# Patient Record
Sex: FEMALE | NOT HISPANIC OR LATINO | URBAN - METROPOLITAN AREA
[De-identification: names, ages, dates, MRNs, and addresses within clinical notes are randomized per-mention and may not be internally consistent; named-entity substitution may affect disease eponyms.]

---

## 2021-09-23 ENCOUNTER — EMERGENCY (EMERGENCY)
Facility: HOSPITAL | Age: 51
LOS: 0 days | Discharge: HOME | End: 2021-09-23
Attending: STUDENT IN AN ORGANIZED HEALTH CARE EDUCATION/TRAINING PROGRAM
Payer: MEDICAID

## 2021-09-23 VITALS
SYSTOLIC BLOOD PRESSURE: 129 MMHG | DIASTOLIC BLOOD PRESSURE: 71 MMHG | OXYGEN SATURATION: 100 % | RESPIRATION RATE: 18 BRPM | TEMPERATURE: 98 F | HEART RATE: 78 BPM

## 2021-09-23 VITALS
TEMPERATURE: 98 F | HEIGHT: 66 IN | RESPIRATION RATE: 17 BRPM | OXYGEN SATURATION: 100 % | SYSTOLIC BLOOD PRESSURE: 182 MMHG | DIASTOLIC BLOOD PRESSURE: 90 MMHG | WEIGHT: 134.92 LBS | HEART RATE: 94 BPM

## 2021-09-23 DIAGNOSIS — R51.9 HEADACHE, UNSPECIFIED: ICD-10-CM

## 2021-09-23 DIAGNOSIS — R11.2 NAUSEA WITH VOMITING, UNSPECIFIED: ICD-10-CM

## 2021-09-23 DIAGNOSIS — R42 DIZZINESS AND GIDDINESS: ICD-10-CM

## 2021-09-23 LAB
ALBUMIN SERPL ELPH-MCNC: 3.9 G/DL — SIGNIFICANT CHANGE UP (ref 3.3–5)
ALP SERPL-CCNC: 69 U/L — SIGNIFICANT CHANGE UP (ref 40–120)
ALT FLD-CCNC: 22 U/L — SIGNIFICANT CHANGE UP (ref 12–78)
ANION GAP SERPL CALC-SCNC: 8 MMOL/L — SIGNIFICANT CHANGE UP (ref 5–17)
APPEARANCE UR: CLEAR — SIGNIFICANT CHANGE UP
AST SERPL-CCNC: 18 U/L — SIGNIFICANT CHANGE UP (ref 15–37)
BILIRUB SERPL-MCNC: 0.3 MG/DL — SIGNIFICANT CHANGE UP (ref 0.2–1.2)
BILIRUB UR-MCNC: NEGATIVE — SIGNIFICANT CHANGE UP
BUN SERPL-MCNC: 10 MG/DL — SIGNIFICANT CHANGE UP (ref 7–23)
CALCIUM SERPL-MCNC: 8.6 MG/DL — SIGNIFICANT CHANGE UP (ref 8.5–10.1)
CHLORIDE SERPL-SCNC: 105 MMOL/L — SIGNIFICANT CHANGE UP (ref 96–108)
CO2 SERPL-SCNC: 24 MMOL/L — SIGNIFICANT CHANGE UP (ref 22–31)
COLOR SPEC: YELLOW — SIGNIFICANT CHANGE UP
CREAT SERPL-MCNC: 0.82 MG/DL — SIGNIFICANT CHANGE UP (ref 0.5–1.3)
DIFF PNL FLD: ABNORMAL
GLUCOSE SERPL-MCNC: 97 MG/DL — SIGNIFICANT CHANGE UP (ref 70–99)
GLUCOSE UR QL: NEGATIVE MG/DL — SIGNIFICANT CHANGE UP
HCG SERPL-ACNC: <1 MIU/ML — SIGNIFICANT CHANGE UP
KETONES UR-MCNC: NEGATIVE — SIGNIFICANT CHANGE UP
LEUKOCYTE ESTERASE UR-ACNC: NEGATIVE — SIGNIFICANT CHANGE UP
NITRITE UR-MCNC: NEGATIVE — SIGNIFICANT CHANGE UP
PH UR: 8 — SIGNIFICANT CHANGE UP (ref 5–8)
POTASSIUM SERPL-MCNC: 3.2 MMOL/L — LOW (ref 3.5–5.3)
POTASSIUM SERPL-SCNC: 3.2 MMOL/L — LOW (ref 3.5–5.3)
PROT SERPL-MCNC: 7.3 GM/DL — SIGNIFICANT CHANGE UP (ref 6–8.3)
PROT UR-MCNC: NEGATIVE MG/DL — SIGNIFICANT CHANGE UP
SODIUM SERPL-SCNC: 137 MMOL/L — SIGNIFICANT CHANGE UP (ref 135–145)
SP GR SPEC: 1.01 — SIGNIFICANT CHANGE UP (ref 1.01–1.02)
TROPONIN I SERPL-MCNC: <0.015 NG/ML — SIGNIFICANT CHANGE UP (ref 0.01–0.04)
UROBILINOGEN FLD QL: NEGATIVE MG/DL — SIGNIFICANT CHANGE UP

## 2021-09-23 PROCEDURE — 85025 COMPLETE CBC W/AUTO DIFF WBC: CPT

## 2021-09-23 PROCEDURE — 70450 CT HEAD/BRAIN W/O DYE: CPT

## 2021-09-23 PROCEDURE — 96374 THER/PROPH/DIAG INJ IV PUSH: CPT

## 2021-09-23 PROCEDURE — 93010 ELECTROCARDIOGRAM REPORT: CPT

## 2021-09-23 PROCEDURE — 99285 EMERGENCY DEPT VISIT HI MDM: CPT

## 2021-09-23 PROCEDURE — 81001 URINALYSIS AUTO W/SCOPE: CPT

## 2021-09-23 PROCEDURE — 36415 COLL VENOUS BLD VENIPUNCTURE: CPT

## 2021-09-23 PROCEDURE — 84702 CHORIONIC GONADOTROPIN TEST: CPT

## 2021-09-23 PROCEDURE — 93005 ELECTROCARDIOGRAM TRACING: CPT

## 2021-09-23 PROCEDURE — G1004: CPT

## 2021-09-23 PROCEDURE — 84484 ASSAY OF TROPONIN QUANT: CPT

## 2021-09-23 PROCEDURE — 80053 COMPREHEN METABOLIC PANEL: CPT

## 2021-09-23 PROCEDURE — 99284 EMERGENCY DEPT VISIT MOD MDM: CPT | Mod: 25

## 2021-09-23 PROCEDURE — 70450 CT HEAD/BRAIN W/O DYE: CPT | Mod: 26,MG

## 2021-09-23 PROCEDURE — 87086 URINE CULTURE/COLONY COUNT: CPT

## 2021-09-23 RX ORDER — METOCLOPRAMIDE HCL 10 MG
10 TABLET ORAL ONCE
Refills: 0 | Status: COMPLETED | OUTPATIENT
Start: 2021-09-23 | End: 2021-09-23

## 2021-09-23 RX ORDER — MECLIZINE HCL 12.5 MG
25 TABLET ORAL ONCE
Refills: 0 | Status: COMPLETED | OUTPATIENT
Start: 2021-09-23 | End: 2021-09-23

## 2021-09-23 RX ORDER — POTASSIUM CHLORIDE 20 MEQ
40 PACKET (EA) ORAL ONCE
Refills: 0 | Status: COMPLETED | OUTPATIENT
Start: 2021-09-23 | End: 2021-09-23

## 2021-09-23 RX ORDER — ACETAMINOPHEN 500 MG
650 TABLET ORAL ONCE
Refills: 0 | Status: COMPLETED | OUTPATIENT
Start: 2021-09-23 | End: 2021-09-23

## 2021-09-23 RX ORDER — MECLIZINE HCL 12.5 MG
1 TABLET ORAL
Qty: 9 | Refills: 0
Start: 2021-09-23 | End: 2021-09-25

## 2021-09-23 RX ORDER — SODIUM CHLORIDE 9 MG/ML
1000 INJECTION INTRAMUSCULAR; INTRAVENOUS; SUBCUTANEOUS ONCE
Refills: 0 | Status: COMPLETED | OUTPATIENT
Start: 2021-09-23 | End: 2021-09-23

## 2021-09-23 RX ADMIN — Medication 40 MILLIEQUIVALENT(S): at 12:48

## 2021-09-23 RX ADMIN — Medication 10 MILLIGRAM(S): at 11:27

## 2021-09-23 RX ADMIN — Medication 650 MILLIGRAM(S): at 11:28

## 2021-09-23 RX ADMIN — SODIUM CHLORIDE 1000 MILLILITER(S): 9 INJECTION INTRAMUSCULAR; INTRAVENOUS; SUBCUTANEOUS at 11:32

## 2021-09-23 RX ADMIN — Medication 25 MILLIGRAM(S): at 11:27

## 2021-09-23 NOTE — ED PROVIDER NOTE - CARE PROVIDER_API CALL
Rubina Melgar (MD)  Clinical Neurophysiology; EEGEpilepsy; Neurology; Sleep Medicine  [address] [facility], [place] [address]  Phone: [phone]  Fax: [phone]  Follow Up Time:

## 2021-09-23 NOTE — ED PROVIDER NOTE - PATIENT PORTAL LINK FT
You can access the FollowMyHealth Patient Portal offered by [facility] by registering at the following website: [email or web address]. By joining [facility]’s FollowMyHealth portal, you will also be able to view your health information using other applications (apps) compatible with our system.

## 2021-09-23 NOTE — ED ADULT TRIAGE NOTE - CHIEF COMPLAINT QUOTE
pt presents to via car. pt reports vertigo x 1 day, hx of vertigo. pt screaming in triage. pt not answering questions. pt vitals stable

## 2021-09-23 NOTE — ED PROVIDER NOTE - OBJECTIVE STATEMENT
51 yof w/ PMHx of vertigo present to ED c.o. x2 days of intermittent dizziness worsening with change of position and head movement. Pt. states last recurrent episode of vertigo was in 2001. Pt. presents with left-sided headache, nausea and vomiting w/ symptoms worsening this morning. Pt. denies numbness, weakness, or trauma.

## 2021-09-23 NOTE — ED ADULT NURSE NOTE - OBJECTIVE STATEMENT
Pt c/o vertigo, left-sided head pain, and n/v that started two days ago.  Pt states symptoms have been getting worse over time.  Pt vomited this morning and has been nauseous since.  Pt denies fever, shortness of breath.  PMH includes vertigo.

## 2021-09-23 NOTE — ED PROVIDER NOTE - CLINICAL SUMMARY MEDICAL DECISION MAKING FREE TEXT BOX
Dizziness work-up; ECG, labs, ct head, meds, re-eval. 52 yo female with dizziness; ECG, labs, ct head, meds, re-eval.

## 2021-09-23 NOTE — ED ADULT NURSE NOTE - NSIMPLEMENTINTERV_GEN_ALL_ED
Implemented All Fall Risk Interventions:  [place] to call system. Call bell, personal items and telephone within reach. Instruct patient to call for assistance. Room bathroom lighting operational. Non-slip footwear when patient is off stretcher. Physically safe environment: no spills, clutter or unnecessary equipment. Stretcher in lowest position, wheels locked, appropriate side rails in place. Provide visual cue, wrist band, yellow gown, etc. Monitor gait and stability. Monitor for mental status changes and reorient to person, place, and time. Review medications for side effects contributing to fall risk. Reinforce activity limits and safety measures with patient and family.

## 2021-09-23 NOTE — ED PROVIDER NOTE - PROGRESS NOTE DETAILS
Hugo Zarate: Pt. feeling better. Able to ambulate, steady gait. Elliot JIMENEZ: patient notified of all labs, no previous H&H; instructed to f/u with neurology and/or ENT as outpatient; strict return precautions given.
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
## 2021-09-24 LAB
CULTURE RESULTS: SIGNIFICANT CHANGE UP
SPECIMEN SOURCE: SIGNIFICANT CHANGE UP

## 2021-10-18 PROBLEM — R42 DIZZINESS AND GIDDINESS: Chronic | Status: ACTIVE | Noted: 2021-09-23

## 2021-10-21 ENCOUNTER — APPOINTMENT (OUTPATIENT)
Dept: INTERNAL MEDICINE | Facility: CLINIC | Age: 51
End: 2021-10-21
Payer: MEDICAID

## 2021-10-21 VITALS
DIASTOLIC BLOOD PRESSURE: 80 MMHG | HEART RATE: 86 BPM | BODY MASS INDEX: 21.53 KG/M2 | OXYGEN SATURATION: 99 % | RESPIRATION RATE: 16 BRPM | WEIGHT: 134 LBS | HEIGHT: 66 IN | SYSTOLIC BLOOD PRESSURE: 144 MMHG | TEMPERATURE: 99 F

## 2021-10-21 DIAGNOSIS — Z80.7 FAMILY HISTORY OF OTHER MALIGNANT NEOPLASMS OF LYMPHOID, HEMATOPOIETIC AND RELATED TISSUES: ICD-10-CM

## 2021-10-21 DIAGNOSIS — Z63.5 DISRUPTION OF FAMILY BY SEPARATION AND DIVORCE: ICD-10-CM

## 2021-10-21 DIAGNOSIS — Z82.49 FAMILY HISTORY OF ISCHEMIC HEART DISEASE AND OTHER DISEASES OF THE CIRCULATORY SYSTEM: ICD-10-CM

## 2021-10-21 DIAGNOSIS — Z80.51 FAMILY HISTORY OF MALIGNANT NEOPLASM OF KIDNEY: ICD-10-CM

## 2021-10-21 DIAGNOSIS — Z80.0 FAMILY HISTORY OF MALIGNANT NEOPLASM OF DIGESTIVE ORGANS: ICD-10-CM

## 2021-10-21 DIAGNOSIS — Z87.19 PERSONAL HISTORY OF OTHER DISEASES OF THE DIGESTIVE SYSTEM: ICD-10-CM

## 2021-10-21 DIAGNOSIS — Z81.8 FAMILY HISTORY OF OTHER MENTAL AND BEHAVIORAL DISORDERS: ICD-10-CM

## 2021-10-21 PROCEDURE — 93000 ELECTROCARDIOGRAM COMPLETE: CPT

## 2021-10-21 PROCEDURE — 99386 PREV VISIT NEW AGE 40-64: CPT | Mod: 25

## 2021-10-21 SDOH — SOCIAL STABILITY - SOCIAL INSECURITY: DISRUPTION OF FAMILY BY SEPARATION AND DIVORCE: Z63.5

## 2021-10-21 NOTE — PAST MEDICAL HISTORY
[Menstruating] : menstruating [Definite ___ (Date)] : the last menstrual period was [unfilled] [Regular Cycle Intervals] : have been regular [Dysmenorrhea] : dysmenorrhea [Total Preg ___] : G[unfilled] [Living ___] : Living: [unfilled]

## 2021-10-22 PROBLEM — Z63.5 DIVORCED: Status: ACTIVE | Noted: 2021-10-22

## 2021-10-22 PROBLEM — Z81.8 FAMILY HISTORY OF DEMENTIA: Status: ACTIVE | Noted: 2021-10-22

## 2021-10-22 PROBLEM — Z80.0 FAMILY HISTORY OF COLON CANCER: Status: ACTIVE | Noted: 2021-10-22

## 2021-10-22 PROBLEM — Z87.19 HISTORY OF HEMORRHOIDS: Status: RESOLVED | Noted: 2021-10-22 | Resolved: 2021-10-22

## 2021-10-22 PROBLEM — Z80.51 FAMILY HISTORY OF RENAL CELL CARCINOMA: Status: ACTIVE | Noted: 2021-10-22

## 2021-10-22 PROBLEM — Z80.7 FAMILY HISTORY OF MULTIPLE MYELOMA: Status: ACTIVE | Noted: 2021-10-22

## 2021-10-22 PROBLEM — Z82.49 FAMILY HISTORY OF HYPERTENSION: Status: ACTIVE | Noted: 2021-10-22

## 2021-10-22 NOTE — REVIEW OF SYSTEMS
[Negative] : Psychiatric [FreeTextEntry2] : see HPI [FreeTextEntry7] : see HPI [FreeTextEntry8] : see HPI [de-identified] : see HPI

## 2021-10-22 NOTE — DATA REVIEWED
[FreeTextEntry1] : HIE \par \par 9/13/21\par \par cbc wnl, except Hg 9.8\par cmp wnl, except K 3.2\par Trop < 0.015\par HCG negative\par UA small blood, no rbc\par ucx: < 10k nl regi\par \par CTH: no acute pathology\par

## 2021-10-22 NOTE — ASSESSMENT
[FreeTextEntry1] : \par 50 yo F pmhx premenstrual syndrome, dysmenorrhea, anemia, chronic headaches, low vit d, s/p ER 9/21 with vertigo here for new pt CPE\par \par hx chronic HAs- recent dizziness, likely BPPV.  Nonfocal neuro exam today.\par -cont. Meclizine prn, refilled\par -advised Epley maneuver, pt handout given and reviewed with pt\par -neuro referral given\par -optho referral for eval\par -advised increased hydration\par -advised prompt medical eval if sx's worsen or new sx's arise\par \par anemia, hx dysmenorrhea, PMS- c/o gen fatigue\par -on Fluoxetine per GYN with good control reported\par -9/21 (ER) Hg 9.8\par -on iron (hx constipation with use)- advised increased hydration and fiber\par -GYN f/u, referral given\par -check labs: cbc/iron studies/B12/folate\par -wishes to consider heme eval re; IV iron if labs done today remain abnl\par -advised prompt medical eval if onset sob, CP, palpitations, etc.\par \par hx low vit d-\par -check level\par \par MISC:  Continued social distancing and measure for covid19 prevention encouraged.  \par -vaccine encouraged\par \par \par HCM\par -check screening labs; agreeable STD/HIV screening\par -wishes to check screening UA at next visit as with menses today\par -STD prevention with regular use of condoms counseled.\par -declines flu shot\par -declines Tdap\par -declines shingles vaccine\par -hx negative PAP 2019 by GYN, referral for new given per request\par -hx negative mammo 2019, Rx for repeat given\par -hx screening colonoscopy 2015: thinks normal.  Unsure of rec's to repeat (+[facility] colon ca).  Unable to get record.  GI referral given.\par -derm referral for screening.  Regular use of sun block for skin cancer prevention advised.\par -yearly dental screening advised\par \par \par Labs drawn in office today.\par

## 2021-10-22 NOTE — HEALTH RISK ASSESSMENT
[0] : 2) Feeling down, depressed, or hopeless: Not at all (0) [PHQ-2 Negative - No further assessment needed] : PHQ-2 Negative - No further assessment needed [Patient reported mammogram was normal] : Patient reported mammogram was normal [Patient reported PAP Smear was normal] : Patient reported PAP Smear was normal [Smoke Detector] : smoke detector [Carbon Monoxide Detector] : carbon monoxide detector [Seat Belt] :  uses seat belt [Sunscreen] : uses sunscreen [[id number]] : 0 [Guns at Home] : no guns at home [MammogramDate] : 01/19 [PapSmearDate] : 01/19 [ColonoscopyDate] : 01/2015 [ColonoscopyComments] : thinks normal.  Unsure of rec's to repeat

## 2021-10-22 NOTE — HISTORY OF PRESENT ILLNESS
[Lives Alone] : Patient lives alone [] :  [Occupation ___] : occupation: [unfilled] [Never Smoked Cigarettes] : has never smoked cigarettes [Never] : has never used illicit drugs [Premenopausal] : the patient is premenopausal [Good] : good [Reg. Dental Visits] : She has regular dental visits [Healthy Diet] : She consumes a diverse and healthy diet [FreeTextEntry1] : \par Here to establish care. [Vision Problems] : She denies vision problems [Hearing Loss] : She denies hearing loss [Regular Exercise] : She does not exercise regularly [de-identified] : 2019, prior PMD [de-identified] : Single [de-identified] : declines hx flu shot, hx Tdap > 10 yrs - declines, hx shingles vaccine [de-identified] : \par Feeling well.\par Fasting today.\par \par \par Reports is socially distancing and using precautions for covid prevention.\par Denies sick or covid positive contacts.\par Denies fever, chills, cough or sob.\par -no vaccine yet\par \par c/o gen fatigue, dizziness and HA x  few weeks\par \par Reports hx HH ER visit 9/13/21 with dizziness- dx'd vertigo\par -had labs\par -had CTH, negative\par -d/c'd home with Rx for meclizine and advised neuro and ENT evals\par \par hx dizziness- reports is 90% better since ER, needs neurology referral\par mainly room spinning sensation a/w nausea with head movements\par took Meclizine with help, requests refill\par denies hx URI ore ear sx's prior to sx onset\par denies vision trouble, dysarthria or focal weakness\par \par hx chronic headaches- at baseline q 6 weeks, since 9/21 more frequent\par -HA: throbbing, frontal, +light/noise sensitivity; no vision loss or dizziness, dysuria or focal weakness\par -resolves with sleep or Excedrin use\par -triggers: dehydration\par -denies hx neurology eval\par -hx negative CTs scans- last 9/21 in ER for vertigo\par -does not follow with optho\par \par hx anemia- "all of my life", unsure of etiology\par -reports hx heavy menses, getting heavier x 1 yr.  Denies known hx of fibroids.  Needs new GYN as recently moved.\par -on oral iron on/off many yrs, consistently x 3 yrs \par -hx screening colonoscopy 2015: thinks normal.  Unsure of rec's to repeat, states unable to get record. +FH colon ca in father at 56yo\par -denies palpitations, CP or sob\par \par Reports recently intentionally lost 15 lbs with intermittent fasting.\par Reports healthy diet, low red meat.\par No formal exercise, stays active\par \par Reports nl appetite and BMs- occasional constipation with iron, better with increased water\par Denies BRBPR, notes stool black when taking oral iron.\par Denies abd pain or vomiting.\par \par hx PMS- reports controlled\par -on Rx by GYN x 1 0 yrs, last seen 2019 - needs new\par \par Sexually active with 1 male partner x 8 yrs, monogamous, no condoms; denies hx STD dx.\par -denies  complaints.\par \par Reports hx dental eval 2 weeks- dx'd with right lower tooth infection with root canal done and abx given- now resolved.\par -denies tooth pain\par

## 2021-10-22 NOTE — PHYSICAL EXAM
[No Acute Distress] : no acute distress [Well-Appearing] : well-appearing [Normal Sclera/Conjunctiva] : normal sclera/conjunctiva [PERRL] : pupils equal round and reactive to light [EOMI] : extraocular movements intact [Normal Outer Ear/Nose] : the outer ears and nose were normal in appearance [Normal Oropharynx] : the oropharynx was normal [Normal TMs] : both tympanic membranes were normal [Normal Nasal Mucosa] : the nasal mucosa was normal [No Lymphadenopathy] : no lymphadenopathy [Supple] : supple [Thyroid Normal, No Nodules] : the thyroid was normal and there were no nodules present [No Respiratory Distress] : no respiratory distress  [Clear to Auscultation] : lungs were clear to auscultation bilaterally [Normal Rate] : normal rate  [Regular Rhythm] : with a regular rhythm [Normal S1, S2] : normal S1 and S2 [No Murmur] : no murmur heard [Pedal Pulses Present] : the pedal pulses are present [No Edema] : there was no peripheral edema [Soft] : abdomen soft [Non Tender] : non-tender [No HSM] : no HSM [Normal Supraclavicular Nodes] : no supraclavicular lymphadenopathy [Normal Posterior Cervical Nodes] : no posterior cervical lymphadenopathy [Normal Anterior Cervical Nodes] : no anterior cervical lymphadenopathy [No CVA Tenderness] : no CVA  tenderness [No Spinal Tenderness] : no spinal tenderness [No Joint Swelling] : no joint swelling [Grossly Normal Strength/Tone] : grossly normal strength/tone [No Rash] : no rash [No Focal Deficits] : no focal deficits [Normal Gait] : normal gait [Normal Affect] : the affect was normal [Alert and Oriented x3] : oriented to person, place, and time [de-identified] : scattered freckles [de-identified] : min horizontal  nystagmus; negative romberg's

## 2021-10-23 LAB
25(OH)D3 SERPL-MCNC: 40 NG/ML
ALBUMIN SERPL ELPH-MCNC: 4.5 G/DL
ALP BLD-CCNC: 77 U/L
ALT SERPL-CCNC: 15 U/L
ANION GAP SERPL CALC-SCNC: 12 MMOL/L
AST SERPL-CCNC: 19 U/L
BASOPHILS # BLD AUTO: 0.04 K/UL
BASOPHILS NFR BLD AUTO: 0.7 %
BILIRUB SERPL-MCNC: <0.2 MG/DL
BUN SERPL-MCNC: 10 MG/DL
C TRACH RRNA SPEC QL NAA+PROBE: NOT DETECTED
CALCIUM SERPL-MCNC: 9.9 MG/DL
CHLORIDE SERPL-SCNC: 104 MMOL/L
CHOLEST SERPL-MCNC: 186 MG/DL
CO2 SERPL-SCNC: 25 MMOL/L
CREAT SERPL-MCNC: 0.91 MG/DL
EOSINOPHIL # BLD AUTO: 0.04 K/UL
EOSINOPHIL NFR BLD AUTO: 0.7 %
ESTIMATED AVERAGE GLUCOSE: 88 MG/DL
FERRITIN SERPL-MCNC: 19 NG/ML
FOLATE SERPL-MCNC: 7 NG/ML
GLUCOSE SERPL-MCNC: 78 MG/DL
HBA1C MFR BLD HPLC: 4.7 %
HBV CORE IGG+IGM SER QL: NONREACTIVE
HBV SURFACE AB SER QL: NONREACTIVE
HBV SURFACE AG SER QL: NONREACTIVE
HCT VFR BLD CALC: 35.7 %
HCV AB SER QL: NONREACTIVE
HCV S/CO RATIO: 0.1 S/CO
HDLC SERPL-MCNC: 73 MG/DL
HGB BLD-MCNC: 10.9 G/DL
HIV1+2 AB SPEC QL IA.RAPID: NONREACTIVE
IMM GRANULOCYTES NFR BLD AUTO: 0.5 %
IRON SATN MFR SERPL: 68 %
IRON SERPL-MCNC: 253 UG/DL
LDLC SERPL CALC-MCNC: 95 MG/DL
LYMPHOCYTES # BLD AUTO: 1.21 K/UL
LYMPHOCYTES NFR BLD AUTO: 21 %
MAN DIFF?: NORMAL
MCHC RBC-ENTMCNC: 27.8 PG
MCHC RBC-ENTMCNC: 30.5 GM/DL
MCV RBC AUTO: 91.1 FL
MONOCYTES # BLD AUTO: 0.36 K/UL
MONOCYTES NFR BLD AUTO: 6.2 %
N GONORRHOEA RRNA SPEC QL NAA+PROBE: NOT DETECTED
NEUTROPHILS # BLD AUTO: 4.09 K/UL
NEUTROPHILS NFR BLD AUTO: 70.9 %
NONHDLC SERPL-MCNC: 113 MG/DL
PLATELET # BLD AUTO: 362 K/UL
POTASSIUM SERPL-SCNC: 4.3 MMOL/L
PROT SERPL-MCNC: 7 G/DL
RBC # BLD: 3.92 M/UL
RBC # FLD: 14.1 %
SODIUM SERPL-SCNC: 142 MMOL/L
SOURCE AMPLIFICATION: NORMAL
T PALLIDUM AB SER QL IA: NEGATIVE
TIBC SERPL-MCNC: 373 UG/DL
TRIGL SERPL-MCNC: 90 MG/DL
TSH SERPL-ACNC: 3.08 UIU/ML
UIBC SERPL-MCNC: 120 UG/DL
VIT B12 SERPL-MCNC: 730 PG/ML
WBC # FLD AUTO: 5.77 K/UL

## 2021-11-17 ENCOUNTER — APPOINTMENT (OUTPATIENT)
Dept: NEUROLOGY | Facility: CLINIC | Age: 51
End: 2021-11-17
Payer: MEDICAID

## 2021-11-17 ENCOUNTER — NON-APPOINTMENT (OUTPATIENT)
Age: 51
End: 2021-11-17

## 2021-11-17 VITALS
DIASTOLIC BLOOD PRESSURE: 76 MMHG | SYSTOLIC BLOOD PRESSURE: 110 MMHG | HEIGHT: 66 IN | HEART RATE: 72 BPM | BODY MASS INDEX: 20.25 KG/M2 | TEMPERATURE: 97.5 F | WEIGHT: 126 LBS

## 2021-11-17 PROCEDURE — 99204 OFFICE O/P NEW MOD 45 MIN: CPT

## 2021-11-17 RX ORDER — MECLIZINE HYDROCHLORIDE 25 MG/1
25 TABLET ORAL
Refills: 0 | Status: COMPLETED | COMMUNITY
End: 2021-11-17

## 2021-11-17 NOTE — PHYSICAL EXAM
[General Appearance - Alert] : alert [General Appearance - In No Acute Distress] : in no acute distress [Oriented To Time, Place, And Person] : oriented to person, place, and time [Affect] : the affect was normal [Impaired Insight] : insight and judgment were intact [Person] : oriented to person [Place] : oriented to place [Time] : oriented to time [Concentration Intact] : normal concentrating ability [Visual Intact] : visual attention was ~T not ~L decreased [Naming Objects] : no difficulty naming common objects [Repeating Phrases] : no difficulty repeating a phrase [Writing A Sentence] : no difficulty writing a sentence [Fluency] : fluency intact [Comprehension] : comprehension intact [Reading] : reading intact [Past History] : adequate knowledge of personal past history [Cranial Nerves Optic (II)] : visual acuity intact bilaterally,  visual fields full to confrontation, pupils equal round and reactive to light [Cranial Nerves Oculomotor (III)] : extraocular motion intact [Cranial Nerves Trigeminal (V)] : facial sensation intact symmetrically [Cranial Nerves Facial (VII)] : face symmetrical [Cranial Nerves Vestibulocochlear (VIII)] : hearing was intact bilaterally [Cranial Nerves Glossopharyngeal (IX)] : tongue and palate midline [Cranial Nerves Accessory (XI - Cranial And Spinal)] : head turning and shoulder shrug symmetric [Cranial Nerves Hypoglossal (XII)] : there was no tongue deviation with protrusion [Motor Tone] : muscle tone was normal in all four extremities [Motor Strength] : muscle strength was normal in all four extremities [No Muscle Atrophy] : normal bulk in all four extremities [Sensation Tactile Decrease] : light touch was intact [Abnormal Walk] : normal gait [Balance] : balance was intact [Past-pointing] : there was no past-pointing [Tremor] : no tremor present [2+] : Ankle jerk left 2+ [Plantar Reflex Right Only] : normal on the right [Plantar Reflex Left Only] : normal on the left [Sclera] : the sclera and conjunctiva were normal [PERRL With Normal Accommodation] : pupils were equal in size, round, reactive to light, with normal accommodation [Extraocular Movements] : extraocular movements were intact [Neck Appearance] : the appearance of the neck was normal [] : the neck was supple [Neck Cervical Mass (___cm)] : no neck mass was observed [Arterial Pulses Carotid] : carotid pulses were normal with no bruits [No Visual Abnormalities] : no visible abnormalities [No Scoliosis] : no scoliosis [No Masses] : no masses [Full ROM] : full ROM [No Pain with ROM] : no pain with motion in any direction

## 2021-11-17 NOTE — HISTORY OF PRESENT ILLNESS
[FreeTextEntry1] : 51-year-old woman right-handed with a history of migraine headaches, experiencing since September of this year, recurrent headaches, variable intensity, instructed beginning of the day, and last throughout the whole day, when severe she takes Excedrin for migraine, which sometimes helps. If the headache intensifies, can get dizzy, nauseous, lightheaded and sound sensitive, and occasionally vomit. Never lost vision, no trouble speaking, no unilateral weakness or numbness of the face or extremities. No episode of passing out or becoming unaware of her surroundings.\par Last September when to [facility], evaluated in the emergency room, a CT scan of the head was done and was negative. [Headache] : headache [Aura] : no aura [Dizziness] : dizziness [Nausea] : nausea [Photophobia] : photophobia [Phonophobia] : phonophobia [Neck Pain] : neck pain [Scotoma] : no scotoma [Numbness] : no numbness [Tingling] : no tingling [Weakness] : no weakness [Scalp Tenderness] : no scalp tenderness [Daily] : daily [> 4 hours] : > 4 hours [stayed the same] : stayed the same [Increased] : Overall, patient's activity level has increased [Worsened] : The patient reports ~his/her~ symptoms since the last visit have worsened

## 2021-11-17 NOTE — DATA REVIEWED
[FreeTextEntry1] :  EXAM:  CT BRAIN                      \par \par \par PROCEDURE DATE:  09/23/2021  \par \par \par \par INTERPRETATION:  CT OF THE HEAD WITHOUT CONTRAST\par \par CLINICAL INDICATION: Dizziness\par \par TECHNIQUE: Volumetric CT acquisition was performed through the brain and reviewed using brain and bone window technique. Dose optimization techniques were utilized including kVp/mA modulation along with iterative reconstructions.\par \par \par COMPARISON: CT head 8/19/2016\par \par FINDINGS:\par \par The ventricular and sulcal size and configuration is age appropriate.   There is no acute loss of gray-white differentiation.\par \par There is no evidence of mass effect, midline shift, acute intracranial hemorrhage, or extra-axial collections.\par \par  The calvarium is intact. The paranasal sinuses are clear.The mastoid air cells are predominantly clear. The orbits are unremarkable.\par \par \par IMPRESSION:\par No acute intracranial hemorrhage or acute territorial infarction.\par

## 2021-11-17 NOTE — ASSESSMENT
[FreeTextEntry1] : 51-year-old woman history of migraine headaches, now with daily headaches in September of this year. CT scan of the head was unremarkable.\par Possible transform migraines. Discuss options, trigger management.\par Plan: Start nortriptyline 10 mg p.o. q.h.s., if tolerated after one week, can increase 2 capsules p.o. q.h.s.\par Maxalt 10 mg, one tablet as needed for headache, can repeat in 2 hours. Maximum of 30 mg per day

## 2021-11-19 NOTE — DATA REVIEWED
[FreeTextEntry1] :  EXAM:  CT BRAIN                         PROCEDURE DATE:  09/23/2021      INTERPRETATION:  CT OF THE HEAD WITHOUT CONTRAST  CLINICAL INDICATION: Dizziness  TECHNIQUE: Volumetric CT acquisition was performed through the brain and reviewed using brain and bone window technique. Dose optimization techniques were utilized including kVp/mA modulation along with iterative reconstructions.   COMPARISON: CT head 8/19/2016  FINDINGS:  The ventricular and sulcal size and configuration is age appropriate.   There is no acute loss of gray-white differentiation.  There is no evidence of mass effect, midline shift, acute intracranial hemorrhage, or extra-axial collections.   The calvarium is intact. The paranasal sinuses are clear.The mastoid air cells are predominantly clear. The orbits are unremarkable.   IMPRESSION: No acute intracranial hemorrhage or acute territorial infarction.  --- End of Report ---

## 2021-11-22 ENCOUNTER — APPOINTMENT (OUTPATIENT)
Dept: INTERNAL MEDICINE | Facility: CLINIC | Age: 51
End: 2021-11-22

## 2021-11-29 ENCOUNTER — RESULT REVIEW (OUTPATIENT)
Age: 51
End: 2021-11-29

## 2021-11-29 ENCOUNTER — APPOINTMENT (OUTPATIENT)
Dept: HEMATOLOGY ONCOLOGY | Facility: CLINIC | Age: 51
End: 2021-11-29
Payer: MEDICAID

## 2021-11-29 ENCOUNTER — OUTPATIENT (OUTPATIENT)
Dept: OUTPATIENT SERVICES | Facility: HOSPITAL | Age: 51
LOS: 1 days | Discharge: HOME | End: 2021-11-29

## 2021-11-29 DIAGNOSIS — D64.9 ANEMIA, UNSPECIFIED: ICD-10-CM

## 2021-11-29 LAB
BASOPHILS # BLD AUTO: 0.03 K/UL — SIGNIFICANT CHANGE UP (ref 0–0.2)
BASOPHILS NFR BLD AUTO: 0.5 % — SIGNIFICANT CHANGE UP (ref 0–2)
EOSINOPHIL # BLD AUTO: 0.06 K/UL — SIGNIFICANT CHANGE UP (ref 0–0.5)
EOSINOPHIL NFR BLD AUTO: 0.9 % — SIGNIFICANT CHANGE UP (ref 0–6)
HCT VFR BLD CALC: 35.6 % — SIGNIFICANT CHANGE UP (ref 34.5–45)
HGB BLD-MCNC: 11.4 G/DL — LOW (ref 11.5–15.5)
IMM GRANULOCYTES NFR BLD AUTO: 0.5 % — SIGNIFICANT CHANGE UP (ref 0–1.5)
LYMPHOCYTES # BLD AUTO: 0.97 K/UL — LOW (ref 1–3.3)
LYMPHOCYTES # BLD AUTO: 14.6 % — SIGNIFICANT CHANGE UP (ref 13–44)
MCHC RBC-ENTMCNC: 27.5 PG — SIGNIFICANT CHANGE UP (ref 27–34)
MCHC RBC-ENTMCNC: 32 GM/DL — SIGNIFICANT CHANGE UP (ref 32–36)
MCV RBC AUTO: 86 FL — SIGNIFICANT CHANGE UP (ref 80–100)
MONOCYTES # BLD AUTO: 0.36 K/UL — SIGNIFICANT CHANGE UP (ref 0–0.9)
MONOCYTES NFR BLD AUTO: 5.4 % — SIGNIFICANT CHANGE UP (ref 2–14)
NEUTROPHILS # BLD AUTO: 5.18 K/UL — SIGNIFICANT CHANGE UP (ref 1.8–7.4)
NEUTROPHILS NFR BLD AUTO: 78.1 % — HIGH (ref 43–77)
NRBC # BLD: 0 /100 WBCS — SIGNIFICANT CHANGE UP (ref 0–0)
PLATELET # BLD AUTO: 263 K/UL — SIGNIFICANT CHANGE UP (ref 150–400)
RBC # BLD: 4.14 M/UL — SIGNIFICANT CHANGE UP (ref 3.8–5.2)
RBC # FLD: 12.5 % — SIGNIFICANT CHANGE UP (ref 10.3–14.5)
WBC # BLD: 6.63 K/UL — SIGNIFICANT CHANGE UP (ref 3.8–10.5)
WBC # FLD AUTO: 6.63 K/UL — SIGNIFICANT CHANGE UP (ref 3.8–10.5)

## 2021-11-29 PROCEDURE — 99203 OFFICE O/P NEW LOW 30 MIN: CPT

## 2021-11-29 NOTE — CONSULT LETTER
[Dear  ___] : Dear  [unfilled], [( Thank you for referring [unfilled] for consultation for _____ )] : Thank you for referring [unfilled] for consultation for [unfilled] [Please see my note below.] : Please see my note below. [Consult Closing:] : Thank you very much for allowing me to participate in the care of this patient.  If you have any questions, please do not hesitate to contact me. [Sincerely,] : Sincerely, [FreeTextEntry2] : Dr Brittany Dinero  [FreeTextEntry3] : Mishel Mcmillan

## 2021-11-29 NOTE — ASSESSMENT
[FreeTextEntry1] : 50 y/o female with chronic anemia and evidence of iron deficiency likely due to ongoing menstrual blood losses. She responds to oral iron but due to Gi side effects ( constipation)  she is not able to increase iron dose to fully replenish iron stores.\par Recommend parenteral iron: feraheme x 2 or Venofer x 4. Discussed benefits and potential adverse effects of iv iron. \par She agrees with plan.\par She also has appointment with GI for routine colonoscopy.\par \par For completeness- sent immunoelectrophoresis and hemoglobin electrophoresis ( r/o component of  hereditary hemoglobinopathy).\par \par Return visit next week to start iv iron.\par \par Exam 4-6 weeks after last iron.

## 2021-11-29 NOTE — HISTORY OF PRESENT ILLNESS
[de-identified] : 52 y/o female with history of chronic anemia  for many years. Her Hgb was down to 7 in the  past. She has been taking Ferrous sulfate 65 mg of elemental iron daily for at least three years ( takes on empty stomach). Did not tolerate twice daily iron - constipation. \par Menses heavy for years. has two children. Chronic fatigue. no GI c/o.\par \par No family hx of anemia

## 2021-11-29 NOTE — REVIEW OF SYSTEMS
[Patient Intake Form Reviewed] : Patient intake form was reviewed [Fatigue] : fatigue [Negative] : Allergic/Immunologic [FreeTextEntry8] : per HPI

## 2021-11-30 DIAGNOSIS — D50.0 IRON DEFICIENCY ANEMIA SECONDARY TO BLOOD LOSS (CHRONIC): ICD-10-CM

## 2021-11-30 LAB
ERYTHROCYTE [SEDIMENTATION RATE] IN BLOOD: 14 MM/HR — SIGNIFICANT CHANGE UP (ref 0–20)
FERRITIN SERPL-MCNC: 19 NG/ML — SIGNIFICANT CHANGE UP (ref 15–150)
IGA FLD-MCNC: 210 MG/DL — SIGNIFICANT CHANGE UP (ref 84–499)
IGG FLD-MCNC: 967 MG/DL — SIGNIFICANT CHANGE UP (ref 610–1660)
IGM SERPL-MCNC: 189 MG/DL — SIGNIFICANT CHANGE UP (ref 35–242)
IRON SATN MFR SERPL: 236 UG/DL — HIGH (ref 30–160)
IRON SATN MFR SERPL: 71 % — HIGH (ref 14–50)
KAPPA LC SER QL IFE: 1.54 MG/DL — SIGNIFICANT CHANGE UP (ref 0.33–1.94)
KAPPA/LAMBDA FREE LIGHT CHAIN RATIO, SERUM: 1.05 RATIO — SIGNIFICANT CHANGE UP (ref 0.26–1.65)
LAMBDA LC SER QL IFE: 1.46 MG/DL — SIGNIFICANT CHANGE UP (ref 0.57–2.63)
PROT SERPL-MCNC: 6.6 G/DL — SIGNIFICANT CHANGE UP (ref 6–8.3)
PROT SERPL-MCNC: 6.6 G/DL — SIGNIFICANT CHANGE UP (ref 6–8.3)
TIBC SERPL-MCNC: 332 UG/DL — SIGNIFICANT CHANGE UP (ref 220–430)
UIBC SERPL-MCNC: 96 UG/DL — LOW (ref 110–370)

## 2021-12-02 LAB
% ALBUMIN: 62.4 % — SIGNIFICANT CHANGE UP
% ALPHA 1: 3.7 % — SIGNIFICANT CHANGE UP
% ALPHA 2: 8.4 % — SIGNIFICANT CHANGE UP
% BETA: 11.4 % — SIGNIFICANT CHANGE UP
% GAMMA: 14.1 % — SIGNIFICANT CHANGE UP
ALBUMIN SERPL ELPH-MCNC: 4.1 G/DL — SIGNIFICANT CHANGE UP (ref 3.6–5.5)
ALBUMIN/GLOB SERPL ELPH: 1.6 RATIO — SIGNIFICANT CHANGE UP
ALPHA1 GLOB SERPL ELPH-MCNC: 0.2 G/DL — SIGNIFICANT CHANGE UP (ref 0.1–0.4)
ALPHA2 GLOB SERPL ELPH-MCNC: 0.6 G/DL — SIGNIFICANT CHANGE UP (ref 0.5–1)
B-GLOBULIN SERPL ELPH-MCNC: 0.8 G/DL — SIGNIFICANT CHANGE UP (ref 0.5–1)
GAMMA GLOBULIN: 0.9 G/DL — SIGNIFICANT CHANGE UP (ref 0.6–1.6)
INTERPRETATION SERPL IFE-IMP: SIGNIFICANT CHANGE UP
PROT PATTERN SERPL ELPH-IMP: SIGNIFICANT CHANGE UP

## 2021-12-13 ENCOUNTER — APPOINTMENT (OUTPATIENT)
Dept: OBGYN | Facility: CLINIC | Age: 51
End: 2021-12-13
Payer: MEDICAID

## 2021-12-13 ENCOUNTER — APPOINTMENT (OUTPATIENT)
Dept: INFUSION THERAPY | Facility: CLINIC | Age: 51
End: 2021-12-13

## 2021-12-13 ENCOUNTER — TRANSCRIPTION ENCOUNTER (OUTPATIENT)
Age: 51
End: 2021-12-13

## 2021-12-13 VITALS — SYSTOLIC BLOOD PRESSURE: 138 MMHG | DIASTOLIC BLOOD PRESSURE: 88 MMHG | WEIGHT: 138 LBS | BODY MASS INDEX: 22.27 KG/M2

## 2021-12-13 DIAGNOSIS — Z01.419 ENCOUNTER FOR GYNECOLOGICAL EXAMINATION (GENERAL) (ROUTINE) W/OUT ABNORMAL FINDINGS: ICD-10-CM

## 2021-12-13 PROCEDURE — 99386 PREV VISIT NEW AGE 40-64: CPT

## 2021-12-14 DIAGNOSIS — D50.9 IRON DEFICIENCY ANEMIA, UNSPECIFIED: ICD-10-CM

## 2021-12-20 LAB — HPV HIGH+LOW RISK DNA PNL CVX: NOT DETECTED

## 2021-12-22 ENCOUNTER — APPOINTMENT (OUTPATIENT)
Dept: GASTROENTEROLOGY | Facility: CLINIC | Age: 51
End: 2021-12-22
Payer: MEDICAID

## 2021-12-22 ENCOUNTER — APPOINTMENT (OUTPATIENT)
Dept: INFUSION THERAPY | Facility: CLINIC | Age: 51
End: 2021-12-22

## 2021-12-22 VITALS
SYSTOLIC BLOOD PRESSURE: 131 MMHG | HEIGHT: 66 IN | WEIGHT: 138 LBS | BODY MASS INDEX: 22.18 KG/M2 | DIASTOLIC BLOOD PRESSURE: 81 MMHG | HEART RATE: 80 BPM

## 2021-12-22 DIAGNOSIS — Z12.11 ENCOUNTER FOR SCREENING FOR MALIGNANT NEOPLASM OF COLON: ICD-10-CM

## 2021-12-22 DIAGNOSIS — Z80.0 ENCOUNTER FOR SCREENING FOR MALIGNANT NEOPLASM OF COLON: ICD-10-CM

## 2021-12-22 PROCEDURE — 99202 OFFICE O/P NEW SF 15 MIN: CPT

## 2021-12-22 NOTE — HISTORY OF PRESENT ILLNESS
[de-identified] : Ms. TOMASA VALLEJO is a 51 year old female presents for screening colonoscopy. Patient has no complaints of bowel issues, bleeding, abdominal pain, GERD symptoms. Patient's father was diagnosed with colon cancer at age 60. Patient does get heavy menses, and is anemic requiring iron infusions. Last colonoscopy was at age 40.\par \par

## 2022-01-10 ENCOUNTER — RESULT REVIEW (OUTPATIENT)
Age: 52
End: 2022-01-10

## 2022-01-10 ENCOUNTER — APPOINTMENT (OUTPATIENT)
Dept: MAMMOGRAPHY | Facility: CLINIC | Age: 52
End: 2022-01-10
Payer: MEDICAID

## 2022-01-10 ENCOUNTER — OUTPATIENT (OUTPATIENT)
Dept: OUTPATIENT SERVICES | Facility: HOSPITAL | Age: 52
LOS: 1 days | End: 2022-01-10
Payer: MEDICAID

## 2022-01-10 DIAGNOSIS — Z00.8 ENCOUNTER FOR OTHER GENERAL EXAMINATION: ICD-10-CM

## 2022-01-10 PROCEDURE — 77067 SCR MAMMO BI INCL CAD: CPT | Mod: 26

## 2022-01-10 PROCEDURE — 77063 BREAST TOMOSYNTHESIS BI: CPT

## 2022-01-10 PROCEDURE — 77063 BREAST TOMOSYNTHESIS BI: CPT | Mod: 26

## 2022-01-10 PROCEDURE — 77067 SCR MAMMO BI INCL CAD: CPT

## 2022-01-18 ENCOUNTER — OUTPATIENT (OUTPATIENT)
Dept: OUTPATIENT SERVICES | Facility: HOSPITAL | Age: 52
LOS: 1 days | Discharge: HOME | End: 2022-01-18
Payer: MEDICAID

## 2022-01-18 DIAGNOSIS — D50.9 IRON DEFICIENCY ANEMIA, UNSPECIFIED: ICD-10-CM

## 2022-01-19 ENCOUNTER — RESULT REVIEW (OUTPATIENT)
Age: 52
End: 2022-01-19

## 2022-01-19 ENCOUNTER — APPOINTMENT (OUTPATIENT)
Dept: HEMATOLOGY ONCOLOGY | Facility: CLINIC | Age: 52
End: 2022-01-19

## 2022-01-19 LAB
BASOPHILS # BLD AUTO: 0.02 K/UL — SIGNIFICANT CHANGE UP (ref 0–0.2)
BASOPHILS NFR BLD AUTO: 0.4 % — SIGNIFICANT CHANGE UP (ref 0–2)
EOSINOPHIL # BLD AUTO: 0.04 K/UL — SIGNIFICANT CHANGE UP (ref 0–0.5)
EOSINOPHIL NFR BLD AUTO: 0.8 % — SIGNIFICANT CHANGE UP (ref 0–6)
FERRITIN SERPL-MCNC: 244 NG/ML — HIGH (ref 15–150)
HCT VFR BLD CALC: 37.6 % — SIGNIFICANT CHANGE UP (ref 34.5–45)
HGB BLD-MCNC: 12 G/DL — SIGNIFICANT CHANGE UP (ref 11.5–15.5)
IMM GRANULOCYTES NFR BLD AUTO: 0.4 % — SIGNIFICANT CHANGE UP (ref 0–1.5)
IRON SATN MFR SERPL: 26 % — SIGNIFICANT CHANGE UP (ref 14–50)
IRON SATN MFR SERPL: 62 UG/DL — SIGNIFICANT CHANGE UP (ref 30–160)
LYMPHOCYTES # BLD AUTO: 1.22 K/UL — SIGNIFICANT CHANGE UP (ref 1–3.3)
LYMPHOCYTES # BLD AUTO: 23.2 % — SIGNIFICANT CHANGE UP (ref 13–44)
MCHC RBC-ENTMCNC: 28.1 PG — SIGNIFICANT CHANGE UP (ref 27–34)
MCHC RBC-ENTMCNC: 31.9 GM/DL — LOW (ref 32–36)
MCV RBC AUTO: 88.1 FL — SIGNIFICANT CHANGE UP (ref 80–100)
MONOCYTES # BLD AUTO: 0.32 K/UL — SIGNIFICANT CHANGE UP (ref 0–0.9)
MONOCYTES NFR BLD AUTO: 6.1 % — SIGNIFICANT CHANGE UP (ref 2–14)
NEUTROPHILS # BLD AUTO: 3.63 K/UL — SIGNIFICANT CHANGE UP (ref 1.8–7.4)
NEUTROPHILS NFR BLD AUTO: 69.1 % — SIGNIFICANT CHANGE UP (ref 43–77)
NRBC # BLD: 0 /100 WBCS — SIGNIFICANT CHANGE UP (ref 0–0)
PLATELET # BLD AUTO: 255 K/UL — SIGNIFICANT CHANGE UP (ref 150–400)
RBC # BLD: 4.27 M/UL — SIGNIFICANT CHANGE UP (ref 3.8–5.2)
RBC # FLD: 13.5 % — SIGNIFICANT CHANGE UP (ref 10.3–14.5)
TIBC SERPL-MCNC: 235 UG/DL — SIGNIFICANT CHANGE UP (ref 220–430)
UIBC SERPL-MCNC: 174 UG/DL — SIGNIFICANT CHANGE UP (ref 110–370)
WBC # BLD: 5.25 K/UL — SIGNIFICANT CHANGE UP (ref 3.8–10.5)
WBC # FLD AUTO: 5.25 K/UL — SIGNIFICANT CHANGE UP (ref 3.8–10.5)

## 2022-01-19 PROCEDURE — 83020 HEMOGLOBIN ELECTROPHORESIS: CPT | Mod: 26

## 2022-01-20 ENCOUNTER — LABORATORY RESULT (OUTPATIENT)
Age: 52
End: 2022-01-20

## 2022-01-20 ENCOUNTER — APPOINTMENT (OUTPATIENT)
Dept: OBGYN | Facility: CLINIC | Age: 52
End: 2022-01-20
Payer: MEDICAID

## 2022-01-20 VITALS
SYSTOLIC BLOOD PRESSURE: 116 MMHG | BODY MASS INDEX: 22.5 KG/M2 | DIASTOLIC BLOOD PRESSURE: 76 MMHG | HEIGHT: 66 IN | WEIGHT: 140 LBS

## 2022-01-20 LAB
HCG UR QL: NEGATIVE
HEMOGLOBIN INTERPRETATION: SIGNIFICANT CHANGE UP
HGB A MFR BLD: 97.5 % — SIGNIFICANT CHANGE UP (ref 95.8–98)
HGB A2 MFR BLD: 2.5 % — SIGNIFICANT CHANGE UP (ref 2–3.2)
QUALITY CONTROL: YES

## 2022-01-20 PROCEDURE — 81025 URINE PREGNANCY TEST: CPT

## 2022-01-20 PROCEDURE — 58100 BIOPSY OF UTERUS LINING: CPT

## 2022-01-20 NOTE — PROCEDURE
[Endometrial Biopsy] : Endometrial biopsy [Time out performed] : Pre-procedure time out performed.  Patient's name, date of birth and procedure confirmed. [Consent Obtained] : Consent obtained [Irregular Bleeding] : irregular uterine bleeding [Risks] : risks [Benefits] : benefits [Alternatives] : alternatives [Patient] : patient [Infection] : infection [Bleeding] : bleeding [Allergic Reaction] : allergic reaction [Uterine Perforation] : uterine perforation [Pain] : pain [Negative] : negative pregnancy test [Betadine] : Betadine [None] : none [Easy Passage] : Easy passage [Anteverted] : anteverted [Scant] : scant [Sent to Pathology] : placed in buffered formalin and sent for pathology [Tolerated Well] : Patient tolerated the procedure well [No Complications] : No complications [de-identified] : Ramírez clamp

## 2022-01-21 LAB
ESTRADIOL SERPL-MCNC: 35 PG/ML
FSH SERPL-MCNC: 45.1 IU/L

## 2022-01-23 ENCOUNTER — APPOINTMENT (OUTPATIENT)
Dept: ULTRASOUND IMAGING | Facility: CLINIC | Age: 52
End: 2022-01-23
Payer: MEDICAID

## 2022-01-23 ENCOUNTER — OUTPATIENT (OUTPATIENT)
Dept: OUTPATIENT SERVICES | Facility: HOSPITAL | Age: 52
LOS: 1 days | End: 2022-01-23
Payer: MEDICAID

## 2022-01-23 DIAGNOSIS — N93.9 ABNORMAL UTERINE AND VAGINAL BLEEDING, UNSPECIFIED: ICD-10-CM

## 2022-01-23 PROCEDURE — 76830 TRANSVAGINAL US NON-OB: CPT | Mod: 26

## 2022-01-23 PROCEDURE — 76830 TRANSVAGINAL US NON-OB: CPT

## 2022-01-25 ENCOUNTER — APPOINTMENT (OUTPATIENT)
Dept: NEUROLOGY | Facility: CLINIC | Age: 52
End: 2022-01-25
Payer: MEDICAID

## 2022-01-25 VITALS
BODY MASS INDEX: 21.69 KG/M2 | HEART RATE: 76 BPM | HEIGHT: 66 IN | DIASTOLIC BLOOD PRESSURE: 85 MMHG | TEMPERATURE: 97.8 F | SYSTOLIC BLOOD PRESSURE: 132 MMHG | WEIGHT: 135 LBS

## 2022-01-25 PROCEDURE — 99213 OFFICE O/P EST LOW 20 MIN: CPT

## 2022-01-25 NOTE — ASSESSMENT
[FreeTextEntry1] : 51-year-old woman history of chronic headaches, migraines,having around 10 headaches a month,and work her up from sleep, was fearful of trying nortriptyline, but is willing to give it a trial.\par Maxalt 10 mg, works as a while in getting related.\par Plan: Will resent a prescription for nortriptyline 10 mg p.o. q.h.s., if tolerable can increase after one week to 20 mg p.o. q.h.s. thereafter.\par Maxalt 10 mg p.o. p.r.n. headache can repeat if needed in 2 hours to\par return to office, 3-4 months

## 2022-01-25 NOTE — HISTORY OF PRESENT ILLNESS
[FreeTextEntry1] : 51 year old woman hx of headaches, here for f/u visit. Previous evaluations have included a Ct of head performed at  ED 9/2020, which was unremarkable. No clear triggers, can last most of the day, has tried OTC analgesics with variable results.\par Prescribed nortriptyline 10 mg po HS, but didn’t take. Also prescribed Maxalt 10 mg po as needed, which she reports works very well.\par .  [Chronic Headache] : chronic headache [Aura] : no aura [Dizziness] : no dizziness [Nausea] : no nausea [Vomiting] : no Vomiting [Neck Pain] : neck pain [___ Times Per Month] : [unfilled] times per month [> 4 hours] : > 4 hours [improved] : improved [Stable] : The patient reports ~his/her~ symptoms since the last visit are stable

## 2022-01-28 ENCOUNTER — NON-APPOINTMENT (OUTPATIENT)
Age: 52
End: 2022-01-28

## 2022-01-31 ENCOUNTER — NON-APPOINTMENT (OUTPATIENT)
Age: 52
End: 2022-01-31

## 2022-02-18 ENCOUNTER — APPOINTMENT (OUTPATIENT)
Dept: GASTROENTEROLOGY | Facility: AMBULATORY MEDICAL SERVICES | Age: 52
End: 2022-02-18
Payer: MEDICAID

## 2022-02-18 PROCEDURE — 45378 DIAGNOSTIC COLONOSCOPY: CPT | Mod: 33

## 2022-03-28 ENCOUNTER — APPOINTMENT (OUTPATIENT)
Dept: NEUROLOGY | Facility: CLINIC | Age: 52
End: 2022-03-28
Payer: MEDICAID

## 2022-03-28 VITALS
SYSTOLIC BLOOD PRESSURE: 158 MMHG | TEMPERATURE: 97.8 F | DIASTOLIC BLOOD PRESSURE: 88 MMHG | HEIGHT: 66 IN | HEART RATE: 75 BPM | BODY MASS INDEX: 22.82 KG/M2 | WEIGHT: 142 LBS

## 2022-03-28 PROCEDURE — 99213 OFFICE O/P EST LOW 20 MIN: CPT

## 2022-03-28 RX ORDER — NORTRIPTYLINE HYDROCHLORIDE 10 MG/1
10 CAPSULE ORAL
Qty: 60 | Refills: 2 | Status: DISCONTINUED | COMMUNITY
Start: 2021-11-17 | End: 2022-03-28

## 2022-03-28 NOTE — ASSESSMENT
[FreeTextEntry1] : 52-year-old woman history of migraine headaches, unable to tolerate nortriptyline, finds the occasional use of p.r.n. Maxalt 10 mg, works well. At this time would prefer not to be on any preventative therapy.\par Reviewed and discussed treatment options.\par Advice to maintain a headache diary.\par return to office, 6 months

## 2022-03-28 NOTE — HISTORY OF PRESENT ILLNESS
[FreeTextEntry1] : 52 year old woman hx of headaches,migraines, last visit prescribed nortriptyline 10 mg po Hs, was having then around 9 headaches per month. uses Maxalt 10 mg po PRN, usually with good results.\par reports tried nortriptyline for about a month, but stopped due to side effects from start having nightmares, the numbness and tingling of the extremities. Reports her headaches are improved, now having less than one a week. In still the Maxalt 10 mg worked very well.\par

## 2022-04-05 ENCOUNTER — RX RENEWAL (OUTPATIENT)
Age: 52
End: 2022-04-05

## 2022-04-17 ENCOUNTER — OUTPATIENT (OUTPATIENT)
Dept: OUTPATIENT SERVICES | Facility: HOSPITAL | Age: 52
LOS: 1 days | Discharge: HOME | End: 2022-04-17

## 2022-04-17 DIAGNOSIS — D50.9 IRON DEFICIENCY ANEMIA, UNSPECIFIED: ICD-10-CM

## 2022-04-18 ENCOUNTER — APPOINTMENT (OUTPATIENT)
Dept: INTERNAL MEDICINE | Facility: CLINIC | Age: 52
End: 2022-04-18
Payer: MEDICAID

## 2022-04-18 VITALS
HEART RATE: 74 BPM | BODY MASS INDEX: 22.27 KG/M2 | OXYGEN SATURATION: 99 % | TEMPERATURE: 98.8 F | DIASTOLIC BLOOD PRESSURE: 70 MMHG | WEIGHT: 138 LBS | SYSTOLIC BLOOD PRESSURE: 126 MMHG | RESPIRATION RATE: 16 BRPM

## 2022-04-18 DIAGNOSIS — Z87.898 PERSONAL HISTORY OF OTHER SPECIFIED CONDITIONS: ICD-10-CM

## 2022-04-18 PROCEDURE — 99213 OFFICE O/P EST LOW 20 MIN: CPT | Mod: 25

## 2022-04-18 RX ORDER — ASPIRIN/ACETAMINOPHEN/CAFFEINE 250-250-65
325 (65 FE) TABLET ORAL TWICE DAILY
Refills: 0 | Status: DISCONTINUED | COMMUNITY
End: 2022-04-18

## 2022-04-18 RX ORDER — AMOXICILLIN 500 MG/1
500 CAPSULE ORAL
Qty: 21 | Refills: 0 | Status: DISCONTINUED | COMMUNITY
Start: 2021-10-09 | End: 2022-04-18

## 2022-04-18 RX ORDER — IBUPROFEN 600 MG/1
600 TABLET, FILM COATED ORAL
Qty: 16 | Refills: 0 | Status: DISCONTINUED | COMMUNITY
Start: 2021-10-09 | End: 2022-04-18

## 2022-04-18 RX ORDER — SODIUM PICOSULFATE, MAGNESIUM OXIDE, AND ANHYDROUS CITRIC ACID 10; 3.5; 12 MG/160ML; G/160ML; G/160ML
10-3.5-12 MG-GM LIQUID ORAL
Qty: 1 | Refills: 0 | Status: DISCONTINUED | COMMUNITY
Start: 2021-12-22 | End: 2022-04-18

## 2022-04-18 RX ORDER — FLUOXETINE 20 MG/1
20 TABLET ORAL
Refills: 0 | Status: DISCONTINUED | COMMUNITY
End: 2022-04-18

## 2022-04-18 NOTE — HISTORY OF PRESENT ILLNESS
[FreeTextEntry1] : \par f/u\par  [de-identified] : \par 51 yo F pmhx premenstrual syndrome, dysmenorrhea, anemia, chronic headaches, low vit d, s/p ER 9/21 with vertigo here for f/u\par \par Last seen 10/21/21 as new pt CPE with labs done.\par \par c/o gen body aching on/off x 2 days, notes onset after spent time near mom and brother both with similar sx's- tested covid negative, feeling better.\par \par Reports is socially distancing and using precautions for covid prevention.\par Denies fever, chills, cough or sob.\par -no hx vaccine, declines\par \par hx chronic headaches- notes overall less frequent and less intense\par -followed by neurology and on Maxalt prn- taking 4x/wk, plans to f/u soon\par -HA: throbbing, frontal, +light/noise sensitivity; no vision loss or dizziness, dysuria or focal weakness\par -resolves with sleep or Excedrin use\par -triggers: dehydration\par -hx negative CTs scans- last 9/21 in ER for vertigo (since resolved)\par -reports nl dilated exxam by optho 10/21, planned to f/u yearly \par \par hx anemia, heavy menses- \par -followed by GYN, seen 12/21 with negative PAP, states told then no medication indicated\par -followed by hematology, hx IV iron x 3- last 12/21, off oral iron since per heme .  Has f/u with hem pending 4/20/22.\par -hx screening colonoscopy 2/22: hemorrhoids, diverticulosis, rec: repeat in 5 yrs (Dr. De Leon)\par -denies palpitations, CP or sob\par \par hx PMS- reports controlled\par -on fluoxetine by GYN\par

## 2022-04-18 NOTE — PHYSICAL EXAM
[No Acute Distress] : no acute distress [Well-Appearing] : well-appearing [Normal Sclera/Conjunctiva] : normal sclera/conjunctiva [PERRL] : pupils equal round and reactive to light [EOMI] : extraocular movements intact [Normal Outer Ear/Nose] : the outer ears and nose were normal in appearance [Normal Oropharynx] : the oropharynx was normal [Normal Nasal Mucosa] : the nasal mucosa was normal [No Lymphadenopathy] : no lymphadenopathy [Supple] : supple [Thyroid Normal, No Nodules] : the thyroid was normal and there were no nodules present [No Respiratory Distress] : no respiratory distress  [Clear to Auscultation] : lungs were clear to auscultation bilaterally [Normal Rate] : normal rate  [Regular Rhythm] : with a regular rhythm [Normal S1, S2] : normal S1 and S2 [No Murmur] : no murmur heard [Pedal Pulses Present] : the pedal pulses are present [No Edema] : there was no peripheral edema [Soft] : abdomen soft [Non Tender] : non-tender [No HSM] : no HSM [Normal Supraclavicular Nodes] : no supraclavicular lymphadenopathy [Normal Posterior Cervical Nodes] : no posterior cervical lymphadenopathy [Normal Anterior Cervical Nodes] : no anterior cervical lymphadenopathy [No CVA Tenderness] : no CVA  tenderness [No Spinal Tenderness] : no spinal tenderness [No Joint Swelling] : no joint swelling [Grossly Normal Strength/Tone] : grossly normal strength/tone [No Rash] : no rash [No Focal Deficits] : no focal deficits [Normal Gait] : normal gait [Normal Affect] : the affect was normal [Alert and Oriented x3] : oriented to person, place, and time [de-identified] : no photophobia

## 2022-04-18 NOTE — REVIEW OF SYSTEMS
[Negative] : Integumentary [Dysuria] : no dysuria [FreeTextEntry2] : see HPI [FreeTextEntry8] : see HPI [FreeTextEntry9] : see HPI [de-identified] : see HPI

## 2022-04-18 NOTE — ASSESSMENT
[FreeTextEntry1] : \par 51 yo F pmhx premenstrual syndrome, dysmenorrhea, anemia, chronic headaches, low vit d, s/p ER 9/21 with vertigo here for f/u\par \par body aches- s/p sick contact with same (neg covid, no other testing done), VSS and nontoxic appearing\par -check RVP/covid swab, collected\par -check cbc/bmp\par -advised rest, increased hydration\par -advised prompt medical eval if sx's worsen or new sx's arise\par \par hx chronic HAs, hx vertigo (resolved, likely BPPV)- improved\par -9/21 CT head: unremarkable\par -followed by neurology and on Maxalt prn.  F/U pending.\par -followed by neurology and on Maxalt prn-\par -advised increased hydration\par -advised prompt medical eval if sx's worsen or new sx's arise\par \par anemia, hx dysmenorrhea, PMS- c/o gen fatigue\par -on Fluoxetine per GYN with good control reported\par -9/21 (ER) Hg 9.8\par -10/21 Hg 10.9, B12/folate/ferritin wnl\par -1/22 cbc wnl\par -followed by GYN, seen 12/21 with negative PAP, states told then no medication indicated\par -followed by hematology, hx IV iron x 3- last 12/21, off oral iron since per heme. Has f/u with hem pending 4/20/22.\par -hx screening colonoscopy 2/22: hemorrhoids, diverticulosis, rec: repeat in 5 yrs (Dr. De Leon)\par -advised prompt medical eval if onset sob, CP, palpitations, etc.\par -check labs: cbc/iron \par \par hx low vit d-\par -10/21 level wnl\par \par MISC:  Continued social distancing and measure for covid19 prevention encouraged.  \par -vaccine encouraged\par \par \par HCM\par -hx CPE 10/21\par -check screening UA - slip given to do 1-2 weeks after menses ends\par -declines flu shot\par -declines Tdap\par -declines shingles vaccine\par -hx negative PAP/HPV 12/21 by GYN\par -hx negative mammo 1/22\par -hx screening colonoscopy 2/22: hemorrhoids, diverticulosis, rec: repeat in 5 yrs (Dr. De Leon)\par -derm referral for screening - pending.  Regular use of sun block for skin cancer prevention advised.\par \par Labs drawn in office today.\par \par

## 2022-04-19 ENCOUNTER — NON-APPOINTMENT (OUTPATIENT)
Age: 52
End: 2022-04-19

## 2022-04-19 LAB
ANION GAP SERPL CALC-SCNC: 11 MMOL/L
BASOPHILS # BLD AUTO: 0.03 K/UL
BASOPHILS NFR BLD AUTO: 0.6 %
BUN SERPL-MCNC: 8 MG/DL
CALCIUM SERPL-MCNC: 9.2 MG/DL
CHLORIDE SERPL-SCNC: 104 MMOL/L
CO2 SERPL-SCNC: 25 MMOL/L
CREAT SERPL-MCNC: 0.9 MG/DL
EGFR: 77 ML/MIN/1.73M2
EOSINOPHIL # BLD AUTO: 0.11 K/UL
EOSINOPHIL NFR BLD AUTO: 2.2 %
FERRITIN SERPL-MCNC: 18 NG/ML
GLUCOSE SERPL-MCNC: 87 MG/DL
HCT VFR BLD CALC: 37.6 %
HGB BLD-MCNC: 12 G/DL
IMM GRANULOCYTES NFR BLD AUTO: 0.4 %
IRON SATN MFR SERPL: 12 %
IRON SERPL-MCNC: 44 UG/DL
LYMPHOCYTES # BLD AUTO: 1.1 K/UL
LYMPHOCYTES NFR BLD AUTO: 22.4 %
MAN DIFF?: NORMAL
MCHC RBC-ENTMCNC: 28.7 PG
MCHC RBC-ENTMCNC: 31.9 GM/DL
MCV RBC AUTO: 90 FL
MONOCYTES # BLD AUTO: 0.33 K/UL
MONOCYTES NFR BLD AUTO: 6.7 %
NEUTROPHILS # BLD AUTO: 3.31 K/UL
NEUTROPHILS NFR BLD AUTO: 67.7 %
PLATELET # BLD AUTO: 303 K/UL
POTASSIUM SERPL-SCNC: 4.5 MMOL/L
RAPID RVP RESULT: NOT DETECTED
RBC # BLD: 4.18 M/UL
RBC # FLD: 12.2 %
SARS-COV-2 RNA PNL RESP NAA+PROBE: NOT DETECTED
SODIUM SERPL-SCNC: 140 MMOL/L
TIBC SERPL-MCNC: 359 UG/DL
UIBC SERPL-MCNC: 315 UG/DL
WBC # FLD AUTO: 4.9 K/UL

## 2022-04-20 ENCOUNTER — RESULT REVIEW (OUTPATIENT)
Age: 52
End: 2022-04-20

## 2022-04-20 ENCOUNTER — APPOINTMENT (OUTPATIENT)
Dept: HEMATOLOGY ONCOLOGY | Facility: CLINIC | Age: 52
End: 2022-04-20

## 2022-04-20 LAB
BASOPHILS # BLD AUTO: 0.01 K/UL — SIGNIFICANT CHANGE UP (ref 0–0.2)
BASOPHILS NFR BLD AUTO: 0.2 % — SIGNIFICANT CHANGE UP (ref 0–2)
EOSINOPHIL # BLD AUTO: 0.08 K/UL — SIGNIFICANT CHANGE UP (ref 0–0.5)
EOSINOPHIL NFR BLD AUTO: 1.6 % — SIGNIFICANT CHANGE UP (ref 0–6)
FERRITIN SERPL-MCNC: 15 NG/ML — SIGNIFICANT CHANGE UP (ref 15–150)
HCT VFR BLD CALC: 37 % — SIGNIFICANT CHANGE UP (ref 34.5–45)
HGB BLD-MCNC: 12 G/DL — SIGNIFICANT CHANGE UP (ref 11.5–15.5)
IMM GRANULOCYTES NFR BLD AUTO: 0.4 % — SIGNIFICANT CHANGE UP (ref 0–1.5)
IRON SATN MFR SERPL: 14 % — SIGNIFICANT CHANGE UP (ref 14–50)
IRON SATN MFR SERPL: 48 UG/DL — SIGNIFICANT CHANGE UP (ref 30–160)
LYMPHOCYTES # BLD AUTO: 1.02 K/UL — SIGNIFICANT CHANGE UP (ref 1–3.3)
LYMPHOCYTES # BLD AUTO: 20.7 % — SIGNIFICANT CHANGE UP (ref 13–44)
MCHC RBC-ENTMCNC: 28.6 PG — SIGNIFICANT CHANGE UP (ref 27–34)
MCHC RBC-ENTMCNC: 32.4 GM/DL — SIGNIFICANT CHANGE UP (ref 32–36)
MCV RBC AUTO: 88.1 FL — SIGNIFICANT CHANGE UP (ref 80–100)
MONOCYTES # BLD AUTO: 0.33 K/UL — SIGNIFICANT CHANGE UP (ref 0–0.9)
MONOCYTES NFR BLD AUTO: 6.7 % — SIGNIFICANT CHANGE UP (ref 2–14)
NEUTROPHILS # BLD AUTO: 3.46 K/UL — SIGNIFICANT CHANGE UP (ref 1.8–7.4)
NEUTROPHILS NFR BLD AUTO: 70.4 % — SIGNIFICANT CHANGE UP (ref 43–77)
NRBC # BLD: 0 /100 WBCS — SIGNIFICANT CHANGE UP (ref 0–0)
PLATELET # BLD AUTO: 267 K/UL — SIGNIFICANT CHANGE UP (ref 150–400)
RBC # BLD: 4.2 M/UL — SIGNIFICANT CHANGE UP (ref 3.8–5.2)
RBC # FLD: 12 % — SIGNIFICANT CHANGE UP (ref 10.3–14.5)
TIBC SERPL-MCNC: 348 UG/DL — SIGNIFICANT CHANGE UP (ref 220–430)
UIBC SERPL-MCNC: 300 UG/DL — SIGNIFICANT CHANGE UP (ref 110–370)
WBC # BLD: 4.92 K/UL — SIGNIFICANT CHANGE UP (ref 3.8–10.5)
WBC # FLD AUTO: 4.92 K/UL — SIGNIFICANT CHANGE UP (ref 3.8–10.5)

## 2022-04-28 ENCOUNTER — NON-APPOINTMENT (OUTPATIENT)
Age: 52
End: 2022-04-28

## 2022-05-09 ENCOUNTER — APPOINTMENT (OUTPATIENT)
Dept: INFUSION THERAPY | Facility: CLINIC | Age: 52
End: 2022-05-09

## 2022-05-09 VITALS
BODY MASS INDEX: 21.99 KG/M2 | RESPIRATION RATE: 17 BRPM | WEIGHT: 136.25 LBS | HEART RATE: 86 BPM | OXYGEN SATURATION: 100 % | DIASTOLIC BLOOD PRESSURE: 83 MMHG | SYSTOLIC BLOOD PRESSURE: 148 MMHG | TEMPERATURE: 98 F

## 2022-05-16 ENCOUNTER — APPOINTMENT (OUTPATIENT)
Dept: INFUSION THERAPY | Facility: CLINIC | Age: 52
End: 2022-05-16

## 2022-05-23 ENCOUNTER — APPOINTMENT (OUTPATIENT)
Dept: INFUSION THERAPY | Facility: CLINIC | Age: 52
End: 2022-05-23

## 2022-06-06 ENCOUNTER — NON-APPOINTMENT (OUTPATIENT)
Age: 52
End: 2022-06-06

## 2022-06-06 ENCOUNTER — APPOINTMENT (OUTPATIENT)
Dept: INTERNAL MEDICINE | Facility: CLINIC | Age: 52
End: 2022-06-06
Payer: MEDICAID

## 2022-06-06 ENCOUNTER — EMERGENCY (EMERGENCY)
Facility: HOSPITAL | Age: 52
LOS: 0 days | Discharge: HOME | End: 2022-06-06
Attending: EMERGENCY MEDICINE
Payer: MEDICAID

## 2022-06-06 VITALS
OXYGEN SATURATION: 100 % | SYSTOLIC BLOOD PRESSURE: 140 MMHG | RESPIRATION RATE: 16 BRPM | HEART RATE: 65 BPM | DIASTOLIC BLOOD PRESSURE: 66 MMHG | TEMPERATURE: 98 F

## 2022-06-06 VITALS
DIASTOLIC BLOOD PRESSURE: 87 MMHG | WEIGHT: 138.01 LBS | RESPIRATION RATE: 17 BRPM | OXYGEN SATURATION: 100 % | TEMPERATURE: 98 F | HEART RATE: 62 BPM | SYSTOLIC BLOOD PRESSURE: 151 MMHG | HEIGHT: 66 IN

## 2022-06-06 VITALS
TEMPERATURE: 98.2 F | BODY MASS INDEX: 22.31 KG/M2 | RESPIRATION RATE: 15 BRPM | WEIGHT: 138.8 LBS | SYSTOLIC BLOOD PRESSURE: 128 MMHG | HEART RATE: 80 BPM | DIASTOLIC BLOOD PRESSURE: 88 MMHG | OXYGEN SATURATION: 99 % | HEIGHT: 66 IN

## 2022-06-06 DIAGNOSIS — R06.02 SHORTNESS OF BREATH: ICD-10-CM

## 2022-06-06 DIAGNOSIS — R53.83 OTHER FATIGUE: ICD-10-CM

## 2022-06-06 DIAGNOSIS — R51.9 HEADACHE, UNSPECIFIED: ICD-10-CM

## 2022-06-06 DIAGNOSIS — R52 PAIN, UNSPECIFIED: ICD-10-CM

## 2022-06-06 DIAGNOSIS — Z28.310 UNVACCINATED FOR COVID-19: ICD-10-CM

## 2022-06-06 DIAGNOSIS — Z20.822 CONTACT WITH AND (SUSPECTED) EXPOSURE TO COVID-19: ICD-10-CM

## 2022-06-06 DIAGNOSIS — R07.9 CHEST PAIN, UNSPECIFIED: ICD-10-CM

## 2022-06-06 DIAGNOSIS — Z86.16 PERSONAL HISTORY OF COVID-19: ICD-10-CM

## 2022-06-06 LAB
ALBUMIN SERPL ELPH-MCNC: 3.3 G/DL — SIGNIFICANT CHANGE UP (ref 3.3–5)
ALP SERPL-CCNC: 65 U/L — SIGNIFICANT CHANGE UP (ref 40–120)
ALT FLD-CCNC: 22 U/L — SIGNIFICANT CHANGE UP (ref 12–78)
ANION GAP SERPL CALC-SCNC: 5 MMOL/L — SIGNIFICANT CHANGE UP (ref 5–17)
APPEARANCE UR: CLEAR — SIGNIFICANT CHANGE UP
AST SERPL-CCNC: 16 U/L — SIGNIFICANT CHANGE UP (ref 15–37)
BASOPHILS # BLD AUTO: 0.03 K/UL — SIGNIFICANT CHANGE UP (ref 0–0.2)
BASOPHILS NFR BLD AUTO: 0.5 % — SIGNIFICANT CHANGE UP (ref 0–2)
BILIRUB SERPL-MCNC: 0.3 MG/DL — SIGNIFICANT CHANGE UP (ref 0.2–1.2)
BILIRUB UR-MCNC: NEGATIVE — SIGNIFICANT CHANGE UP
BUN SERPL-MCNC: 11 MG/DL — SIGNIFICANT CHANGE UP (ref 7–23)
CALCIUM SERPL-MCNC: 8.7 MG/DL — SIGNIFICANT CHANGE UP (ref 8.5–10.1)
CHLORIDE SERPL-SCNC: 110 MMOL/L — HIGH (ref 96–108)
CO2 SERPL-SCNC: 28 MMOL/L — SIGNIFICANT CHANGE UP (ref 22–31)
COLOR SPEC: YELLOW — SIGNIFICANT CHANGE UP
CREAT SERPL-MCNC: 0.73 MG/DL — SIGNIFICANT CHANGE UP (ref 0.5–1.3)
D DIMER BLD IA.RAPID-MCNC: <150 NG/ML DDU — SIGNIFICANT CHANGE UP
DIFF PNL FLD: ABNORMAL
EGFR: 99 ML/MIN/1.73M2 — SIGNIFICANT CHANGE UP
EOSINOPHIL # BLD AUTO: 0.07 K/UL — SIGNIFICANT CHANGE UP (ref 0–0.5)
EOSINOPHIL NFR BLD AUTO: 1.2 % — SIGNIFICANT CHANGE UP (ref 0–6)
FLUAV AG NPH QL: SIGNIFICANT CHANGE UP
FLUBV AG NPH QL: SIGNIFICANT CHANGE UP
GLUCOSE SERPL-MCNC: 82 MG/DL — SIGNIFICANT CHANGE UP (ref 70–99)
GLUCOSE UR QL: NEGATIVE — SIGNIFICANT CHANGE UP
HCT VFR BLD CALC: 34.7 % — SIGNIFICANT CHANGE UP (ref 34.5–45)
HGB BLD-MCNC: 11.1 G/DL — LOW (ref 11.5–15.5)
IMM GRANULOCYTES NFR BLD AUTO: 0.5 % — SIGNIFICANT CHANGE UP (ref 0–1.5)
KETONES UR-MCNC: NEGATIVE — SIGNIFICANT CHANGE UP
LEUKOCYTE ESTERASE UR-ACNC: NEGATIVE — SIGNIFICANT CHANGE UP
LIDOCAIN IGE QN: 106 U/L — SIGNIFICANT CHANGE UP (ref 73–393)
LYMPHOCYTES # BLD AUTO: 1.12 K/UL — SIGNIFICANT CHANGE UP (ref 1–3.3)
LYMPHOCYTES # BLD AUTO: 19.9 % — SIGNIFICANT CHANGE UP (ref 13–44)
MAGNESIUM SERPL-MCNC: 2.2 MG/DL — SIGNIFICANT CHANGE UP (ref 1.6–2.6)
MCHC RBC-ENTMCNC: 28 PG — SIGNIFICANT CHANGE UP (ref 27–34)
MCHC RBC-ENTMCNC: 32 GM/DL — SIGNIFICANT CHANGE UP (ref 32–36)
MCV RBC AUTO: 87.6 FL — SIGNIFICANT CHANGE UP (ref 80–100)
MONOCYTES # BLD AUTO: 0.4 K/UL — SIGNIFICANT CHANGE UP (ref 0–0.9)
MONOCYTES NFR BLD AUTO: 7.1 % — SIGNIFICANT CHANGE UP (ref 2–14)
NEUTROPHILS # BLD AUTO: 3.99 K/UL — SIGNIFICANT CHANGE UP (ref 1.8–7.4)
NEUTROPHILS NFR BLD AUTO: 70.8 % — SIGNIFICANT CHANGE UP (ref 43–77)
NITRITE UR-MCNC: NEGATIVE — SIGNIFICANT CHANGE UP
PH UR: 7 — SIGNIFICANT CHANGE UP (ref 5–8)
PHOSPHATE SERPL-MCNC: 3.1 MG/DL — SIGNIFICANT CHANGE UP (ref 2.5–4.5)
PLATELET # BLD AUTO: 303 K/UL — SIGNIFICANT CHANGE UP (ref 150–400)
POTASSIUM SERPL-MCNC: 4 MMOL/L — SIGNIFICANT CHANGE UP (ref 3.5–5.3)
POTASSIUM SERPL-SCNC: 4 MMOL/L — SIGNIFICANT CHANGE UP (ref 3.5–5.3)
PROT SERPL-MCNC: 6.5 GM/DL — SIGNIFICANT CHANGE UP (ref 6–8.3)
PROT UR-MCNC: NEGATIVE — SIGNIFICANT CHANGE UP
RBC # BLD: 3.96 M/UL — SIGNIFICANT CHANGE UP (ref 3.8–5.2)
RBC # FLD: 14 % — SIGNIFICANT CHANGE UP (ref 10.3–14.5)
RSV RNA NPH QL NAA+NON-PROBE: SIGNIFICANT CHANGE UP
SARS-COV-2 RNA SPEC QL NAA+PROBE: SIGNIFICANT CHANGE UP
SODIUM SERPL-SCNC: 143 MMOL/L — SIGNIFICANT CHANGE UP (ref 135–145)
SP GR SPEC: 1 — LOW (ref 1.01–1.02)
TROPONIN I, HIGH SENSITIVITY RESULT: 4.66 NG/L — SIGNIFICANT CHANGE UP
TSH SERPL-MCNC: 2.23 UU/ML — SIGNIFICANT CHANGE UP (ref 0.34–4.82)
UROBILINOGEN FLD QL: NEGATIVE — SIGNIFICANT CHANGE UP
WBC # BLD: 5.64 K/UL — SIGNIFICANT CHANGE UP (ref 3.8–10.5)
WBC # FLD AUTO: 5.64 K/UL — SIGNIFICANT CHANGE UP (ref 3.8–10.5)

## 2022-06-06 PROCEDURE — 85379 FIBRIN DEGRADATION QUANT: CPT

## 2022-06-06 PROCEDURE — 81001 URINALYSIS AUTO W/SCOPE: CPT

## 2022-06-06 PROCEDURE — 93005 ELECTROCARDIOGRAM TRACING: CPT

## 2022-06-06 PROCEDURE — 84484 ASSAY OF TROPONIN QUANT: CPT

## 2022-06-06 PROCEDURE — 80053 COMPREHEN METABOLIC PANEL: CPT

## 2022-06-06 PROCEDURE — 71045 X-RAY EXAM CHEST 1 VIEW: CPT

## 2022-06-06 PROCEDURE — 99285 EMERGENCY DEPT VISIT HI MDM: CPT | Mod: 25

## 2022-06-06 PROCEDURE — 93000 ELECTROCARDIOGRAM COMPLETE: CPT | Mod: 59

## 2022-06-06 PROCEDURE — 99213 OFFICE O/P EST LOW 20 MIN: CPT | Mod: 25

## 2022-06-06 PROCEDURE — 99285 EMERGENCY DEPT VISIT HI MDM: CPT

## 2022-06-06 PROCEDURE — 36415 COLL VENOUS BLD VENIPUNCTURE: CPT

## 2022-06-06 PROCEDURE — 87086 URINE CULTURE/COLONY COUNT: CPT

## 2022-06-06 PROCEDURE — 85025 COMPLETE CBC W/AUTO DIFF WBC: CPT

## 2022-06-06 PROCEDURE — 71045 X-RAY EXAM CHEST 1 VIEW: CPT | Mod: 26

## 2022-06-06 PROCEDURE — 0241U: CPT

## 2022-06-06 PROCEDURE — 83735 ASSAY OF MAGNESIUM: CPT

## 2022-06-06 PROCEDURE — 84100 ASSAY OF PHOSPHORUS: CPT

## 2022-06-06 PROCEDURE — 83690 ASSAY OF LIPASE: CPT

## 2022-06-06 PROCEDURE — 84443 ASSAY THYROID STIM HORMONE: CPT

## 2022-06-06 PROCEDURE — 93010 ELECTROCARDIOGRAM REPORT: CPT

## 2022-06-06 RX ORDER — SODIUM CHLORIDE 9 MG/ML
1000 INJECTION INTRAMUSCULAR; INTRAVENOUS; SUBCUTANEOUS ONCE
Refills: 0 | Status: COMPLETED | OUTPATIENT
Start: 2022-06-06 | End: 2022-06-06

## 2022-06-06 RX ORDER — ACETAMINOPHEN 500 MG
650 TABLET ORAL ONCE
Refills: 0 | Status: COMPLETED | OUTPATIENT
Start: 2022-06-06 | End: 2022-06-06

## 2022-06-06 RX ADMIN — SODIUM CHLORIDE 1000 MILLILITER(S): 9 INJECTION INTRAMUSCULAR; INTRAVENOUS; SUBCUTANEOUS at 15:30

## 2022-06-06 RX ADMIN — SODIUM CHLORIDE 1000 MILLILITER(S): 9 INJECTION INTRAMUSCULAR; INTRAVENOUS; SUBCUTANEOUS at 13:49

## 2022-06-06 RX ADMIN — Medication 650 MILLIGRAM(S): at 13:48

## 2022-06-06 NOTE — ED PROVIDER NOTE - NS ED ATTENDING STATEMENT MOD
I have seen and examined this patient and fully participated in the care of this patient as the teaching attending.  The service was shared with the ELIAZAR.  I reviewed and verified the documentation and independently performed the documented:

## 2022-06-06 NOTE — ED PROVIDER NOTE - OBJECTIVE STATEMENT
53 y/o female with a PMHx of vertigo presents to the ED sent by PCP. Pt tested COVID + on 5/22. Pt took Tylenol and cough medicine at home without improvement. Pt with fatigue, CP, SOB and HA. Not vaccinated for COVID. No other complaints at this time. 53 y/o female with a PMHx of vertigo, migraines presents to the ED sent by PCP. Pt tested COVID + on . Pt took Tylenol and cough medicine at home without improvement. Pt with fatigue, CP, SOB and HA. Not vaccinated for COVID. No other complaints at this time. Pt was at a  for her Mother on 22.

## 2022-06-06 NOTE — PHYSICAL EXAM
[No Acute Distress] : no acute distress [Well-Appearing] : well-appearing [Normal Sclera/Conjunctiva] : normal sclera/conjunctiva [PERRL] : pupils equal round and reactive to light [EOMI] : extraocular movements intact [Normal Oropharynx] : the oropharynx was normal [No Lymphadenopathy] : no lymphadenopathy [Supple] : supple [Thyroid Normal, No Nodules] : the thyroid was normal and there were no nodules present [No Respiratory Distress] : no respiratory distress  [No Accessory Muscle Use] : no accessory muscle use [Clear to Auscultation] : lungs were clear to auscultation bilaterally [Normal Rate] : normal rate  [Regular Rhythm] : with a regular rhythm [Normal S1, S2] : normal S1 and S2 [No Murmur] : no murmur heard [Pedal Pulses Present] : the pedal pulses are present [No Edema] : there was no peripheral edema [Normal Supraclavicular Nodes] : no supraclavicular lymphadenopathy [Normal Posterior Cervical Nodes] : no posterior cervical lymphadenopathy [Normal Anterior Cervical Nodes] : no anterior cervical lymphadenopathy [No CVA Tenderness] : no CVA  tenderness [No Spinal Tenderness] : no spinal tenderness [No Joint Swelling] : no joint swelling [Grossly Normal Strength/Tone] : grossly normal strength/tone [No Rash] : no rash [No Focal Deficits] : no focal deficits [Normal Gait] : normal gait [Normal Affect] : the affect was normal [Alert and Oriented x3] : oriented to person, place, and time [de-identified] : appears lethargic [de-identified] : no photophobia [de-identified] : no sinus tenderness [de-identified] : no chest tenderness [de-identified] : no calf tenderness, no palpable cord

## 2022-06-06 NOTE — ED ADULT NURSE NOTE - OBJECTIVE STATEMENT
pt presents to er for evaluation of malaise, fatigue, shortness of breath, back pain after covid dx. pt w no palpitaitons, spo2 100% on room air. speaking in full sentences no respiratory distress noted.

## 2022-06-06 NOTE — ED PROVIDER NOTE - PATIENT PORTAL LINK FT
You can access the FollowMyHealth Patient Portal offered by [facility] by registering at the following website: [email or web address]. By joining [facility]’s FollowMyHealth portal, you will also be able to view your health information using other applications (apps) compatible with our system.

## 2022-06-06 NOTE — ED PROVIDER NOTE - NS ED ROS FT
Constitutional: No fever or chills +fatigue   Eyes: No visual changes  HEENT: No throat pain  CV: +chest pain  Resp: +SOB no cough  GI: No abd pain, nausea or vomiting  : No dysuria  MSK: No musculoskeletal pain  Skin: No rash  Neuro: +headache

## 2022-06-06 NOTE — ED PROVIDER NOTE - PROGRESS NOTE DETAILS
pt here with c/c of recently had COVID. pt states she feels SOB. pt denies any chest pain, abdominal pain, n/v or any other complaints currently. pt aware of cxr and lab results and will fu with her PMD. pt agrees with plan and well appearing on dc. -Tuyet Juan PA-C pt feeling better. thinks this could also be grief related. dec po. relieved labs and cxr wnl. asking to eat. will await UA result, give second liter IVFs, give food tray. antiicpate dc home ,MD pt aware of labs and imaging results and encouraged to stay hydrated and y with pmd and return to ED for any worsening of symptoms. pt well appearing on dc. -Tuyet Juan PA-C

## 2022-06-06 NOTE — ASSESSMENT
[FreeTextEntry1] : \par 51 yo F pmhx premenstrual syndrome, dysmenorrhea, anemia, chronic headaches, low vit d, s/p ER 9/21 with vertigo here for acute visit\par \par \par hx covid infection 2 weeks ago- c/o persistent fatigue limiting activities since covid infection and now dizziness, shortness of breath x2 weeks with recent transient left sided CP. VSS, orthostatics negative.\par -EKG: NSR @ 74 bpm, incomplete RBBB, nl axis, no LVH/path Q (no sig chg c/w 9/21)\par -advised prompt ER evaluation for further management to r/o acute respiratory/cardiac pathology - agreeable.  EMS called.\par -pt advised to f/u in office after ER visit\par \par hx chronic HAs, hx vertigo (resolved, likely BPPV)- improved\par -9/21 CT head: unremarkable\par -followed by neurology and on Maxalt prn. F/U pending.\par -followed by neurology and on Maxalt prn-\par -advised increased hydration\par -advised prompt medical eval if sx's worsen or new sx's arise\par \par anemia, hx dysmenorrhea, PMS- \par -on Fluoxetine per GYN with good control reported\par -9/21 (ER) Hg 9.8\par -10/21 Hg 10.9, B12/folate/ferritin wnl\par -4/22 cbc/iron studies wnl\par -followed by GYN, seen 12/21 with negative PAP, states told then no medication indicated\par -followed by hematology, hx IV iron x 3- last  3 weeks ago, off oral iron since per heme.\par -hx screening colonoscopy 2/22: hemorrhoids, diverticulosis, rec: repeat in 5 yrs (Dr. De Leon)\par -advised prompt medical eval if onset sob, CP, palpitations, etc.\par \par \par

## 2022-06-06 NOTE — REVIEW OF SYSTEMS
[Negative] : Integumentary [FreeTextEntry2] : see HPI [FreeTextEntry5] : see HPI [FreeTextEntry6] : see HPI [FreeTextEntry7] : see HPI [FreeTextEntry8] : see HPI [FreeTextEntry9] : see HPI [de-identified] : see HPI [de-identified] : see HPI

## 2022-06-06 NOTE — ED PROVIDER NOTE - PHYSICAL EXAMINATION
Constitutional: NAD AOx3  Eyes: PERRL EOMI  Head: Normocephalic atraumatic  Mouth: MMM  Cardiac: regular rate and rhythm  Resp: Lungs CTAB  GI: Abd s/nd/nt  Neuro: CN2-12 grossly intact, RAMOS x 4  Skin: No visible rashes

## 2022-06-06 NOTE — ED STATDOCS - PROGRESS NOTE DETAILS
pt here with c/c of recently had COVID. pt states she feels SOB. pt denies any chest pain, abdominal pain, n/v or any other complaints currently. pt aware of cxr and lab results and will fu with her PMD. pt agrees with plan and well appearing on dc. -Tuyet Juan PA-C

## 2022-06-06 NOTE — ED ADULT NURSE NOTE - NSIMPLEMENTINTERV_GEN_ALL_ED
Implemented All Universal Safety Interventions:  [place] to call system. Call bell, personal items and telephone within reach. Instruct patient to call for assistance. Room bathroom lighting operational. Non-slip footwear when patient is off stretcher. Physically safe environment: no spills, clutter or unnecessary equipment. Stretcher in lowest position, wheels locked, appropriate side rails in place.

## 2022-06-06 NOTE — ED ADULT TRIAGE NOTE - CHIEF COMPLAINT QUOTE
patient sent from PCP with multiple complaints.  patient tested + for covid 5/23.  has since been experiencing intermittent fatigue, dizziness, chest tightness, SOB.  VS stable in triage

## 2022-06-06 NOTE — ED ADULT NURSE NOTE - CAS DISCH TRANSFER METHOD
HPI


Chief Complaint:  Skin Problem


Time Seen by Provider:  08:37


Travel History


International Travel<30 days:  No


Contact w/Intl Traveler<30days:  No


Traveled to known affect area:  No





History of Present Illness


HPI


This 40-year-old female is complaining of drainage from an umbilical wound.  

She was admitted to the hospital on June 4 with acute appendicitis.  He had an 

appendectomy done with Dr. Schneider.  She has done well since the surgery.  She 

did have some irritation at the umbilical port since his surgery.  She saw Dr. Schneider in follow-up on Tuesday and was well.  This morning when she got up she 

noted there was purulent drainage from the umbilical site.  She has been 

eating.  She has not had a fever.





PFSH


Past Medical History


Hx Anticoagulant Therapy:  No


ADD:  Yes


Diabetes:  No


Diminished Hearing:  No


Insomnia:  Yes


Tetanus Vaccination:  Unknown


Influenza Vaccination:  No


Pregnant?:  Not Pregnant


LMP:  3 WEEKS





Past Surgical History


Appendectomy:  Yes (6/4/18)





Social History


Alcohol Use:  No


Tobacco Use:  No


Substance Use:  No





Allergies-Medications


(Allergen,Severity, Reaction):  


Coded Allergies:  


     No Known Allergies (Verified  Allergy, Unknown, 6/14/18)


Reported Meds & Prescriptions





Reported Meds & Active Scripts


Active


Zofran (Ondansetron HCl) 4 Mg Tab 4 Mg PO Q6HR PRN


Norco (Hydrocodone-Acetaminophen) 7.5-325 mg Tab 1-2 Tab PO Q4H PRN








Review of Systems


Except as stated in HPI:  all other systems reviewed are Neg


General / Constitutional:  No: Fever, Chills


Eyes:  No: Diploplia


HENT:  No: Headaches


Cardiovascular:  No: Chest Pain or Discomfort


Respiratory:  No: Cough, Shortness of Breath


Gastrointestinal:  No: Nausea, Vomiting


Genitourinary:  No: Urgency


Skin:  No Rash


Endocrine:  No: Heat Intolerance, Cold Intolerance


Hematologic/Lymphatic:  No: Easy Bruising





Physical Exam


Narrative


GENERAL: Well-developed female


SKIN: Focused skin assessment warm/dry.


HEAD: Atraumatic. Normocephalic. 


EYES: Pupils equal and round. No scleral icterus. No injection or drainage. 


ENT: No nasal bleeding or discharge.  Mucous membranes pink and moist.


NECK: Trachea midline. No JVD. 


CARDIOVASCULAR: Regular rate and rhythm.  No murmur appreciated.


RESPIRATORY: No accessory muscle use. Clear to auscultation. Breath sounds 

equal bilaterally. 


GASTROINTESTINAL: Abdomen soft, non-tender, nondistended. Hepatic and splenic 

margins not palpable.  There are Steri-Strips over the incision from the 

appendectomy.  There is purulent drainage coming from under the Steri-Strip 

overlying the umbilicus.  I have removed that Steri-Stripped to promote 

drainage.  A culture has been obtained.  No masses palpable


MUSCULOSKELETAL: No obvious deformities. No clubbing.  No cyanosis.  No edema. 


NEUROLOGICAL: Awake and alert. No obvious cranial nerve deficits.  Motor 

grossly within normal limits. Normal speech.


PSYCHIATRIC: Appropriate mood and affect; insight and judgment normal.





Data


Data


Last Documented VS





Vital Signs








  Date Time  Temp Pulse Resp B/P (MAP) Pulse Ox O2 Delivery O2 Flow Rate FiO2


 


6/14/18 10:48  105 16 127/85 (99) 100 Room Air  


 


6/14/18 08:09 97.6       








Orders





 Orders


Wound Culture And Gram Stain (6/14/18 08:37)


Complete Blood Count With Diff (6/14/18 08:47)


Basic Metabolic Panel (Bmp) (6/14/18 08:47)


Ct Abd/Pel W Iv Contrast(Rout) (6/14/18 08:47)


Iohexol 350 Inj (Omnipaque 350 Inj) (6/14/18 09:57)





Labs





Laboratory Tests








Test


  6/14/18


09:00


 


White Blood Count 13.0 TH/MM3 


 


Red Blood Count 4.62 MIL/MM3 


 


Hemoglobin 13.4 GM/DL 


 


Hematocrit 40.1 % 


 


Mean Corpuscular Volume 86.9 FL 


 


Mean Corpuscular Hemoglobin 28.9 PG 


 


Mean Corpuscular Hemoglobin


Concent 33.3 % 


 


 


Red Cell Distribution Width 13.3 % 


 


Platelet Count 440 TH/MM3 


 


Mean Platelet Volume 7.6 FL 


 


Neutrophils (%) (Auto) 77.2 % 


 


Lymphocytes (%) (Auto) 14.8 % 


 


Monocytes (%) (Auto) 5.0 % 


 


Eosinophils (%) (Auto) 0.8 % 


 


Basophils (%) (Auto) 2.2 % 


 


Neutrophils # (Auto) 10.1 TH/MM3 


 


Lymphocytes # (Auto) 1.9 TH/MM3 


 


Monocytes # (Auto) 0.6 TH/MM3 


 


Eosinophils # (Auto) 0.1 TH/MM3 


 


Basophils # (Auto) 0.3 TH/MM3 


 


CBC Comment DIFF FINAL 


 


Differential Comment  


 


Blood Urea Nitrogen 6 MG/DL 


 


Creatinine 0.58 MG/DL 


 


Random Glucose 88 MG/DL 


 


Calcium Level 9.3 MG/DL 


 


Sodium Level 136 MEQ/L 


 


Potassium Level 4.1 MEQ/L 


 


Chloride Level 101 MEQ/L 


 


Carbon Dioxide Level 28.3 MEQ/L 


 


Anion Gap 7 MEQ/L 


 


Estimat Glomerular Filtration


Rate 115 ML/MIN 


 











MDM


Medical Decision Making


Medical Screen Exam Complete:  Yes


Emergency Medical Condition:  Yes


Medical Record Reviewed:  Yes


Differential Diagnosis


Differential includes superficial skin infection versus deeper abscess.


Narrative Course


CT scan has been ordered to assess for possible deep abscess.  CT scan shows a 

3.5 cm fluid collection immediately below the umbilicus external to the 

peritoneal cavity.  Case discussed with Dr. Schneider.  The patient will be placed 

on antibiotics and she is to follow-up with him in the office.  There is been 

spontaneous drainage of pus from the wound.  We did try to open the area more 

to promote drainage but the patient did not tolerate the procedure well and she 

is draining well.  She will be released with prescription for Keflex





Diagnosis





 Primary Impression:  


 S/P laparoscopic appendectomy


 Additional Impression:  


 Skin abscess


Scripts


Cephalexin (Keflex) 500 Mg Capsule


500 MG PO Q6H for Infection for 10 Days, #40 CAP 0 Refills


   Prov: Vicente Muniz MD         6/14/18


Disposition:  01 DISCHARGE HOME


Condition:  Stable











Vicente Muniz MD Jun 14, 2018 08:40
Private car

## 2022-06-07 ENCOUNTER — APPOINTMENT (OUTPATIENT)
Dept: INTERNAL MEDICINE | Facility: CLINIC | Age: 52
End: 2022-06-07
Payer: MEDICAID

## 2022-06-07 LAB
CULTURE RESULTS: SIGNIFICANT CHANGE UP
SPECIMEN SOURCE: SIGNIFICANT CHANGE UP

## 2022-06-07 PROCEDURE — 99442: CPT

## 2022-06-07 NOTE — HISTORY OF PRESENT ILLNESS
[Home] : at home, [unfilled] , at the time of the visit. [Medical Office: ([facility])___] : at the medical office located in  [Verbal consent obtained from patient] : the patient, [unfilled] [FreeTextEntry1] : \par As this is a telemedicine visit, no physical exam could be performed.  Diagnosis and treatment is based on symptoms provided.\par \par  [de-identified] : \par 51 yo F pmhx premenstrual syndrome, dysmenorrhea, anemia, chronic headaches, low vit d, s/p ER 9/21 with vertigo for f/u\par \par Last seen yesterday with hx covid infection 2 weeks prior c/o persistent fatigue limiting activities since covid infection and now dizziness, shortness of breath x2 weeks with recent transient left sided CP.  Sent to  ER for further evaluation.\par \par Is S/P  ER eval 6/6/22\par -had labs, cxr- told all normal\par -given IVFs in ER\par -d/c'd home w/o new Rx's\par \par hx fatigue, sob on/off, dizziness on/off- reports since ER feeling the same.  Denies recurrence of CP.\par \par Denies LOC, HA, vision trouble, dysarthria or focal weakness.\par Denies n/v, abd pain. Having nl BMs.\par Denies  complaints.\par +menses ended yesterday\par \par c/o left lower back pain on/off x 5d\par sharp pain, focal\par onset with bending, trying to rise or move positions.  Resolved with laying flat on back.\par Denies paresthesias, imbalance, incontinence or focal weakness.\par \par Denies hx trauma.  Notes did  new (heavy) vacuum  and used- back pain started later in the day\par Notes has been laying in bed more in past few weeks since covid infection 2/2 fatigue.\par \par States tried Tylenol ES 2 q 6 hrs w/o significant help.  No NSAIDs tried- requests Rx. Denies hx abnl bleeding or GI bleeding.\par Using heat w/o help.\par Massaging helps temporarily.\par \par Notes mother passed away 5/20/22- was 89, had flu infection- feels has good social support, declines  referral\par \par \par Reports is socially distancing and using precautions for covid prevention.\par -no hx vaccine, declines\par \par hx chronic headaches- notes overall less frequent and less intense\par -followed by neurology and on Maxalt prn- taking 4x/wk, plans to f/u soon\par -HA: throbbing, frontal, +light/noise sensitivity; no vision loss or dizziness, dysuria or focal weakness\par -resolves with sleep or Excedrin use\par -triggers: dehydration\par -hx negative CTs scans- last 9/21 in ER for vertigo (since resolved)\par -reports nl dilated exam by optho 10/21, planned to f/u yearly \par \par hx anemia, heavy menses- \par -followed by GYN, seen 12/21 with negative PAP, states told then no medication indicated\par -followed by hematology, hx IV iron- last 3 weeks ago, off oral iron since per heme. F/u pending.\par -hx screening colonoscopy 2/22: hemorrhoids, diverticulosis, rec: repeat in 5 yrs (Dr. De Leon)\par \par hx PMS- reports controlled\par -on fluoxetine by GYN\par

## 2022-06-07 NOTE — DATA REVIEWED
[FreeTextEntry1] : [facility]- to be scanned into chart\par \par 6/6/22\par \par covid/influenza/RVP pcr negative\par d-dimer < 150\par cbc wnl, except Hg 11.1\par cmp wnl\par Trop 4.66\par lipase 106\par mg 2.2\par TSH 2.23\par phos 3.1\par \par cxr: clear lungs\par

## 2022-06-07 NOTE — ASSESSMENT
[FreeTextEntry1] : \par 53 yo F pmhx premenstrual syndrome, dysmenorrhea, anemia, chronic headaches, low vit d, hx covid infection 5/22, s/p ER 9/21 with vertigo here for f/u\par \par \par hx covid infection 2 weeks ago- c/o persistent fatigue limiting activities since covid infection and now dizziness, shortness of breath x2 weeks with recent transient left sided CP. s/p HH ER eval 6/6/22 with negative w/u\par -6/22 cmp/TSH/d-dimer, RVP negative\par -6/22 Hg 11.1 (stable)\par -6/22 cxr: negative\par -6/22 EKG: NSR @ 74 bpm, incomplete RBBB, nl axis, no LVH/path Q (no sig chg c/w 9/21)\par -cardiology eval advised, referral placed and info for PAC given for assistance\par -pulmonary eval advised, referral placed and info for PAC given for assistance\par -advised increased hydration \par -advised prompt ER if sx's worsen or new sx's arise\par \par LBP- suspect muscle strain, but exam limited due to nature of telephone visit.\par -trial of Naproxen with food, advised to avoid OTC NSAIDs use concurrently. May use Tylenol prn.\par -ortho referral given, info for fast track scheduling given for assistance\par -advised heat/ice, massaging\par -advised prompt ER if sx's worsen or new sx's arise\par \par hx chronic HAs, hx vertigo (resolved, likely BPPV)- improved\par -9/21 CT head: unremarkable\par -followed by neurology and on Maxalt prn. F/U pending.\par -followed by neurology and on Maxalt prn-\par -advised increased hydration\par -advised prompt medical eval if sx's worsen or new sx's arise\par \par anemia, hx dysmenorrhea, PMS- \par -on Fluoxetine per GYN with good control reported\par -9/21 (ER) Hg 9.8\par -10/21 Hg 10.9, B12/folate/ferritin wnl\par -4/22 cbc/iron studies wnl\par -6/22 Hg 11.1 (stable)\par -followed by GYN, seen 12/21 with negative PAP, states told then no medication indicated\par -followed by hematology, hx IV iron x 3- last 3 weeks ago, off oral iron since per heme.  F/U pending.\par -hx screening colonoscopy 2/22: hemorrhoids, diverticulosis, rec: repeat in 5 yrs (Dr. De Leon)\par -advised prompt medical eval if onset sob, CP, palpitations, etc.\par \par grief- mom passes away 5/20/22\par -reports good social support\par -declines  referral\par \par \par Pt advised to f/u in office in 2 weeks for f/u.  States will call back.\par

## 2022-06-17 ENCOUNTER — APPOINTMENT (OUTPATIENT)
Dept: CARDIOLOGY | Facility: CLINIC | Age: 52
End: 2022-06-17
Payer: MEDICAID

## 2022-06-17 ENCOUNTER — NON-APPOINTMENT (OUTPATIENT)
Age: 52
End: 2022-06-17

## 2022-06-17 VITALS — DIASTOLIC BLOOD PRESSURE: 80 MMHG | SYSTOLIC BLOOD PRESSURE: 130 MMHG

## 2022-06-17 VITALS — SYSTOLIC BLOOD PRESSURE: 126 MMHG | DIASTOLIC BLOOD PRESSURE: 76 MMHG

## 2022-06-17 VITALS
SYSTOLIC BLOOD PRESSURE: 140 MMHG | OXYGEN SATURATION: 100 % | WEIGHT: 138 LBS | DIASTOLIC BLOOD PRESSURE: 90 MMHG | HEIGHT: 66 IN | HEART RATE: 82 BPM | BODY MASS INDEX: 22.18 KG/M2

## 2022-06-17 PROCEDURE — 93000 ELECTROCARDIOGRAM COMPLETE: CPT

## 2022-06-17 PROCEDURE — 99205 OFFICE O/P NEW HI 60 MIN: CPT

## 2022-06-17 NOTE — ASSESSMENT
[FreeTextEntry1] : A/P:\par \par *Chest pain\par *Shortness of breath\par *Dizziness\par \par -orthostatics today neg, doubt cardiac causes\par  of dizziness or other symptoms listed.\par -Suspect due to "long haul COVID" & pulmonary issues\par rather that cardiac disease.\par \par -exercise treadmill\par -Echo\par -BNP\par \par Return in 2-3 weeks to review results.

## 2022-06-17 NOTE — HISTORY OF PRESENT ILLNESS
[FreeTextEntry1] : TOMASA VALLEJO (TOMASA) \par 1970(52y)\par \par 51 y/o\par \par referred for "COVID, dizziness, chest pain, shortness of breath"\par \par no prior cardiac history or cardiac risk factors.\par \par COVID infection started May 23rd.\par Fever 102, extreme body aches, slight cough, slight dyspnea.\par Treated as OP, no COVID meds, home remedies.\par Symptoms improved in about 24 hrs, but dyspnea and chest discomfort L sided\par persisted.\par chest pain feels like something is "grabbing her in chest" lasting 30 sec.\par Resolves w/ deep breaths in & sitting up, no relation to eating\par no relation to walking.  comes on when lying flat.\par Dyspnea occurs intermittently every day multiple times.\par seconds-mnutes, no relation to lying flat, no PND,\par mild LE edema swelling. no dyspnea when leaning forward\par No palpitaitons.  Also reports lightheadness not vertiginous\par constant throughout day.'' Not worse w/ standing.\par \par On fluoxetine for PMS, migraines on triptan,\par \par no prior cardiac procedures, surgeries or testing.\par \par h/o anemia requiring fe infusion.\par \par ECG NSR no ischemic changes.\par

## 2022-06-28 ENCOUNTER — NON-APPOINTMENT (OUTPATIENT)
Age: 52
End: 2022-06-28

## 2022-06-28 ENCOUNTER — APPOINTMENT (OUTPATIENT)
Dept: ORTHOPEDIC SURGERY | Facility: CLINIC | Age: 52
End: 2022-06-28
Payer: MEDICAID

## 2022-06-28 VITALS
HEART RATE: 64 BPM | BODY MASS INDEX: 22.18 KG/M2 | WEIGHT: 138 LBS | DIASTOLIC BLOOD PRESSURE: 92 MMHG | HEIGHT: 66 IN | SYSTOLIC BLOOD PRESSURE: 164 MMHG

## 2022-06-28 DIAGNOSIS — M25.541 PAIN IN JOINTS OF RIGHT HAND: ICD-10-CM

## 2022-06-28 DIAGNOSIS — M41.9 SCOLIOSIS, UNSPECIFIED: ICD-10-CM

## 2022-06-28 DIAGNOSIS — M25.542 PAIN IN JOINTS OF RIGHT HAND: ICD-10-CM

## 2022-06-28 DIAGNOSIS — M47.816 SPONDYLOSIS W/OUT MYELOPATHY OR RADICULOPATHY, LUMBAR REGION: ICD-10-CM

## 2022-06-28 PROCEDURE — 72100 X-RAY EXAM L-S SPINE 2/3 VWS: CPT

## 2022-06-28 PROCEDURE — 99204 OFFICE O/P NEW MOD 45 MIN: CPT

## 2022-06-28 NOTE — HISTORY OF PRESENT ILLNESS
[de-identified] : Patient presents for an evaluation with regard to 5 days back pain referred by her primary care physician.  pain was Left-sided, radiated down the left lower extremity intermittently but not continuously. There is no weakness of the legs, no change in bowel or bladder habits.  Pain is not increased with cough, sneeze, increased intra-abdominal pressure. Spasm leg/sore quality. She states that she was lifting and bending continuously taking care of her mom who recently passed. She also had a recent diagnosis of cold bed after which time she appreciates not only low back pain but also bilateral hand pain, arthralgias of the digits. Pain of the back and leg is decreasing through time. She has not had any recent physical therapy or chiropractic but she has had acupuncture which she states is helping her to accomplish her activities of daily living, getting better through time. It first when the pain began, rated 9/10 and prevented her from walking easily, now she is able to walk without problem.\par Past medical surgical social and allergy history was reviewed with the patient at length. She does have some history of hypertension, she is concerned that today in the office her blood pressure is high. Denies shortness of breath or chest pain today.  [place] ACDF status post slip and fall resulting in a cervical disc herniation years ago. She does not have any radicular pain or neck pain today

## 2022-06-28 NOTE — PHYSICAL EXAM
[de-identified] :  CONSTITUTIONAL: The patient is a very pleasant individual who is well-nourished and who appears stated age.\par PSYCHIATRIC: The patient is alert and oriented X 3 and in no apparent distress, and participates with orthopedic evaluation well.\par HEAD: Atraumatic and is nonsyndromic in appearance.\par EENT: No visible thyromegaly, EOMI.\par RESPIRATORY: Respiratory rate is regular, not dyspneic on examination.\par LYMPHATICS: There is no inguinal lymphadenopathy\par INTEGUMENTARY: Skin is clean, dry, and intact about the bilateral lower An upper extremities and lumbar Cervical and thoracic spine.\par VASCULAR: There is brisk capillary refill about the bilateral lower An upper extremities.\par NEUROLOGIC: There are no pathologic reflexes. There is no decrease in sensation of the bilateral lower An upperextremities on Wartenberg pinwheel examination. Deep tendon reflexes are well maintained at 2+/4 of the bilateral lower extremities and are symmetric..\par MUSCULOSKELETAL: There is no visible muscular atrophy. Manual motor strength is well maintained in the bilateral lower extremities. Range of motion of lumbar spine is well maintained. The patient ambulates in a non-myelopathic manner. Negative tension sign and straight leg raise bilaterally. Quad extension, ankle dorsiflexion, EHL, plantar flexion, and ankle eversion are well preserved. Normal secondary orthopaedic exam of bilateral hips, greater trochanteric area, knees and ankles\par Exam is highlighted by mild mechanical left-sided low back pain, lumbar lower myositis spine palpation on the left.Polyarthralgias of the hand. [de-identified] : Lumbar x-ray 2 views taken on June 28, 2022 has been reviewed.  Shows essentially a normal lumbar x-ray.

## 2022-06-28 NOTE — DISCUSSION/SUMMARY
[Medication Risks Reviewed] : Medication risks reviewed [de-identified] : Conservative treatment was discussed with the patient at length. Anticipatory guidance regarding disease process, avoidance of acute exacerbation this was discussed at length and all patients commenting concerns were answered to the patient's satisfaction. Physical therapy for decrease pain and increase function was ordered. Patient was given home exercises as approved by [employer] and works well held directed toward this particular process. Intermittent use of acetaminophen 500 mg 2 tablets t.i.d. p.r.n. mild to moderate pain,. Home exercise including stretching on a daily basis for 20-30 minutes was recommended. Heat, ice, topical were discussed as needed. The patient will followup in 6-8 weeks at which point in time if symptoms continue we will order MRI studies to guide treatment plan including possible injection therapy with pain management versus surgical option\par  Which is unlikely.  She would like to exhaust conservative treatment including acupuncture is helping to relieve her pain and add on physical therapy. I think this is a great approach to her myositis-type pain. \par Guarding pain of her digits, pain of multiple joints, she can be referred to rheumatology if this continues.\par Name status post ACDF in the past with neck pain returns for radiculitis and screws, she can followup here or followup with her operating surgeon. At this time she is asymptomatic no followup as needed regarding her neck\par Her blood pressure was applied today however she denies current shortness of breath headache diplopia or chest pain. She was instructed to call her primary care provider ASAP to address this matter. Avoid NSAIDs due to current hypertension.

## 2022-06-29 ENCOUNTER — APPOINTMENT (OUTPATIENT)
Dept: INTERNAL MEDICINE | Facility: CLINIC | Age: 52
End: 2022-06-29
Payer: MEDICAID

## 2022-06-29 VITALS
TEMPERATURE: 98.1 F | WEIGHT: 140 LBS | HEIGHT: 66 IN | BODY MASS INDEX: 22.5 KG/M2 | DIASTOLIC BLOOD PRESSURE: 98 MMHG | SYSTOLIC BLOOD PRESSURE: 132 MMHG | HEART RATE: 66 BPM | OXYGEN SATURATION: 99 %

## 2022-06-29 DIAGNOSIS — Z12.39 ENCOUNTER FOR OTHER SCREENING FOR MALIGNANT NEOPLASM OF BREAST: ICD-10-CM

## 2022-06-29 DIAGNOSIS — Z87.898 PERSONAL HISTORY OF OTHER SPECIFIED CONDITIONS: ICD-10-CM

## 2022-06-29 DIAGNOSIS — R06.00 DYSPNEA, UNSPECIFIED: ICD-10-CM

## 2022-06-29 PROCEDURE — 99214 OFFICE O/P EST MOD 30 MIN: CPT

## 2022-06-29 NOTE — ASSESSMENT
[FreeTextEntry1] : 1. elevated BP without diagnosis of HTN \par BP today 132/80 \par recommend checking BP twice a day  and recording \par pt will call if > 140/90 persistently \par Avoid NSAID use \par limit sodium intake 2 gr. . daily \par recent lab work recent review and discussed with  pt   \par Pt to schedule f/up with Dr. Dinero  in 4 - 6 weeks

## 2022-06-29 NOTE — PHYSICAL EXAM
[No Acute Distress] : no acute distress [Well Nourished] : well nourished [Well Developed] : well developed [Normal Oropharynx] : the oropharynx was normal [Normal TMs] : both tympanic membranes were normal [No Lymphadenopathy] : no lymphadenopathy [Supple] : supple [No Respiratory Distress] : no respiratory distress  [No Accessory Muscle Use] : no accessory muscle use [Clear to Auscultation] : lungs were clear to auscultation bilaterally [Normal Rate] : normal rate  [Regular Rhythm] : with a regular rhythm [Normal S1, S2] : normal S1 and S2 [Pedal Pulses Present] : the pedal pulses are present [No Extremity Clubbing/Cyanosis] : no extremity clubbing/cyanosis [Coordination Grossly Intact] : coordination grossly intact [No Focal Deficits] : no focal deficits [Normal Gait] : normal gait [Normal Affect] : the affect was normal [Alert and Oriented x3] : oriented to person, place, and time [Normal Insight/Judgement] : insight and judgment were intact

## 2022-06-29 NOTE — HISTORY OF PRESENT ILLNESS
[FreeTextEntry8] : Pt is a 52 yr, old female with a h/ of anemia, lumbar spondylosis, thoracic scoliosis. \par She reports went to orthopedic MD yesterday for back pain , BP was 164/92.  She bought a BP machine after the visit and took her BP last night 167/107, before bed it was 148/98. She denies recent use  of NSAID or Maxalt . She reports a general  low salt diet. She has had no migraine activity recently. She notes her triggers are dehydration and  missing meals. \par She does admit to increase stress over the recent passing of her mother on 5/20/22 , along with gallo COVID herself on 5/23/22. \par She woke up today her blood pressure was 134/ 82. \par Today in the office BPis 132/80 taken by myself. Pt denies vision changes, headache, lightheadedness.

## 2022-07-03 ENCOUNTER — RX RENEWAL (OUTPATIENT)
Age: 52
End: 2022-07-03

## 2022-07-11 ENCOUNTER — APPOINTMENT (OUTPATIENT)
Dept: INTERNAL MEDICINE | Facility: CLINIC | Age: 52
End: 2022-07-11

## 2022-07-11 VITALS
OXYGEN SATURATION: 97 % | RESPIRATION RATE: 16 BRPM | DIASTOLIC BLOOD PRESSURE: 84 MMHG | SYSTOLIC BLOOD PRESSURE: 124 MMHG | BODY MASS INDEX: 22.02 KG/M2 | WEIGHT: 137 LBS | HEART RATE: 83 BPM | TEMPERATURE: 98.2 F | HEIGHT: 66 IN

## 2022-07-11 PROCEDURE — ZZZZZ: CPT

## 2022-07-11 PROCEDURE — 94060 EVALUATION OF WHEEZING: CPT

## 2022-07-11 PROCEDURE — 94727 GAS DIL/WSHOT DETER LNG VOL: CPT

## 2022-07-11 PROCEDURE — 94729 DIFFUSING CAPACITY: CPT

## 2022-07-11 PROCEDURE — 99204 OFFICE O/P NEW MOD 45 MIN: CPT | Mod: 25

## 2022-07-11 NOTE — PROCEDURE
[FreeTextEntry1] : Full pulmonary function testing today is within normal limits with an FEV1 of 2.98 or 117% predicted.  TLC is normal.  Diffusing capacity is normal.  Oxygen saturation is 97% on room air.

## 2022-07-11 NOTE — PHYSICAL EXAM
[No Acute Distress] : no acute distress [Normal Oropharynx] : normal oropharynx [Normal Appearance] : normal appearance [No Neck Mass] : no neck mass [Normal Rate/Rhythm] : normal rate/rhythm [Normal S1, S2] : normal s1, s2 [No Resp Distress] : no resp distress [Clear to Auscultation Bilaterally] : clear to auscultation bilaterally [No Abnormalities] : no abnormalities [Benign] : benign [Normal Gait] : normal gait [No Clubbing] : no clubbing [No Cyanosis] : no cyanosis [No Edema] : no edema [Normal Color/ Pigmentation] : normal color/ pigmentation [No Focal Deficits] : no focal deficits [Oriented x3] : oriented x3 [Normal Affect] : normal affect

## 2022-07-11 NOTE — HISTORY OF PRESENT ILLNESS
[TextBox_4] : This is the first pulmonary appointment for this 52-year-old female with a history of headache, diverticulosis, who had COVID infection on May 23, 2022.  Since that time she has had variable tiredness, dizziness, shortness of breath, atypical chest discomforts.  She currently is not having shortness of breath or dyspnea on exertion.  She denies coughing, wheezing, sputum production, or hemoptysis.  She does occasionally have some palpitations.  She is not having any syncope.  She denies fever or chills.  She was seen in the emergency room on June 6 and had a normal D-dimer and chest x-ray.  She has no history of asthma.  She does not smoke cigarettes.

## 2022-07-11 NOTE — ASSESSMENT
[FreeTextEntry1] : #1  Variable symptoms as noted above.  h.o. COVID infection at the end of May of this year.  Likely post-COVID symptoms.  Patient will gradually increase activity as tolerated.  She will ensure hydration.  She will take Tylenol as needed.  She will have stress testing and echocardiogram as per cardiology.  Patient will continue to follow with her PCP.  She knows to go to the emergency room if having significant increase in symptoms or emergent symptoms.

## 2022-07-13 ENCOUNTER — APPOINTMENT (OUTPATIENT)
Dept: CARDIOLOGY | Facility: CLINIC | Age: 52
End: 2022-07-13

## 2022-07-13 PROCEDURE — 93306 TTE W/DOPPLER COMPLETE: CPT

## 2022-07-13 PROCEDURE — 93015 CV STRESS TEST SUPVJ I&R: CPT

## 2022-07-29 ENCOUNTER — APPOINTMENT (OUTPATIENT)
Dept: CARDIOLOGY | Facility: CLINIC | Age: 52
End: 2022-07-29

## 2022-08-11 ENCOUNTER — APPOINTMENT (OUTPATIENT)
Dept: INTERNAL MEDICINE | Facility: CLINIC | Age: 52
End: 2022-08-11

## 2022-08-12 ENCOUNTER — APPOINTMENT (OUTPATIENT)
Dept: INTERNAL MEDICINE | Facility: CLINIC | Age: 52
End: 2022-08-12

## 2022-08-29 ENCOUNTER — APPOINTMENT (OUTPATIENT)
Dept: INTERNAL MEDICINE | Facility: CLINIC | Age: 52
End: 2022-08-29

## 2022-08-29 VITALS
OXYGEN SATURATION: 98 % | HEIGHT: 66 IN | DIASTOLIC BLOOD PRESSURE: 90 MMHG | TEMPERATURE: 98.8 F | WEIGHT: 138 LBS | BODY MASS INDEX: 22.18 KG/M2 | RESPIRATION RATE: 16 BRPM | HEART RATE: 71 BPM | SYSTOLIC BLOOD PRESSURE: 138 MMHG

## 2022-08-29 VITALS — DIASTOLIC BLOOD PRESSURE: 80 MMHG | SYSTOLIC BLOOD PRESSURE: 132 MMHG | WEIGHT: 138 LBS | BODY MASS INDEX: 22.27 KG/M2

## 2022-08-29 DIAGNOSIS — R03.0 ELEVATED BLOOD-PRESSURE READING, W/OUT DIAGNOSIS OF HYPERTENSION: ICD-10-CM

## 2022-08-29 PROCEDURE — 99213 OFFICE O/P EST LOW 20 MIN: CPT

## 2022-08-29 RX ORDER — HYDROCORTISONE 25 MG/G
2.5 CREAM TOPICAL
Qty: 28 | Refills: 0 | Status: DISCONTINUED | COMMUNITY
Start: 2021-10-04 | End: 2022-08-29

## 2022-08-29 RX ORDER — NAPROXEN 500 MG/1
500 TABLET ORAL
Qty: 30 | Refills: 0 | Status: DISCONTINUED | COMMUNITY
Start: 2022-06-07 | End: 2022-08-29

## 2022-08-29 NOTE — HISTORY OF PRESENT ILLNESS
[FreeTextEntry1] : \par f/u [de-identified] : \par 53 yo F pmhx premenstrual syndrome, dysmenorrhea, anemia, chronic headaches, low vit d, hx covid infection 5/22, s/p ER 9/21 with vertigo here for f/u\par \par Last seen by another provider 6/29/22 for BP check after called reporting was elevated at ortho visit\par -found with nl BP at visit \par \par Feeling well today.\par Needs med refill.\par \par Reports is socially distancing and using precautions for covid prevention.\par Denies fever, chills, cough or sob.\par -no hx vaccine, declines\par \par hx covid infection 5/22- hx persistent fatigue limiting activities since covid infection, dizziness, shortness of breath and transient left sided CP. \par -is s/p HH ER eval 6/6/22 with negative w/u\par -followed by cardiology, seen 6/22 thought possibly long-haul covid sx's, advised echo (done 6/22) and EST (done 7/22). F/U pending.\par -hx echo 6/22 EF 55-60%, MVP, mild-mod MR, mild TR, nl LV fxn and size\par -seen by pulmonary 7/22 with prior records reviewed, thought sx's likely post-covid infection related and to improve with time. Advised f/u prn.\par \par Reports all prior reported sx's have resolved on own since last visit.\par -denies dizziness, fatigue, CP or sob.\par -recently started exercise: walking 20 mins/d with daughter- done w/o sx's or limitations\par \par hx grief- reports improved\par -denies depression or anxiety\par \par hx left lower back pain- seen by ortho 6/22 with PT advised.  Reports improved with recent course of acupuncture.  Plans to also see chiropractor soon. No pain meds used.\par -hx sharp pain, focal\par -hx onset with bending, trying to rise or move positions. Resolved with laying flat on back.\par -denies paresthesias, imbalance, incontinence or focal weakness.\par \par hx chronic headaches- notes overall less frequent and less intense\par -followed by neurology and on Maxalt prn- taking 1-2x/wk\par -HA: throbbing, frontal, +light/noise sensitivity; no vision loss or dizziness, dysuria or focal weakness\par -resolves with sleep or Excedrin use\par -triggers: dehydration\par -hx negative CTs scans- last 9/21 in ER for vertigo (since resolved)\par -reports nl dilated exam by optho 10/21, planned to f/u yearly \par \par hx anemia, heavy menses- \par -followed by GYN, seen 12/21 with negative PAP, states told then no medication indicated\par -followed by hematology, hx IV iron, off oral iron since per heme. F/u pending.\par -hx screening colonoscopy 2/22: hemorrhoids, diverticulosis, rec: repeat in 5 yrs (Dr. De Leon)\par \par hx PMS- reports controlled\par -on fluoxetine by GYN, taking x 10 yrs- denies SEs.  F/U pending, requests Rx refill today\par \par Reports hx hand arthritis on/off, requests rheum referral\par

## 2022-08-29 NOTE — ASSESSMENT
[FreeTextEntry1] : \par 51 yo F pmhx premenstrual syndrome, dysmenorrhea, anemia, chronic headaches, low vit d, hx covid infection 5/22, s/p ER 9/21 with vertigo here for f/u\par \par \par hx covid infection a/w persistent fatigue, dizziness, shortness of breath, hx transient left sided CP.   Reports self resolved.\par -s/p HH ER eval 6/6/22 with negative w/u\par -6/22 cmp/TSH/d-dimer, RVP negative\par -6/22 Hg 11.1 (stable)\par -6/22 cxr: negative\par -6/22 EKG: NSR @ 74 bpm, incomplete RBBB, nl axis, no LVH/path Q (no sig chg c/w 9/21)\par -followed by cardiology, seen 6/22 thought possibly long-haul covid sx's, advised echo (done 6/22) and EST (done 7/22) F/U pending.\par -hx echo 6/22 EF 55-60%, MVP, mild-mod MR, mild TR, nl LV fxn and size\par -seen by pulmonary 7/22 with prior records reviewed, thought sx's likely post-covid infection related and to improve with time. Advised f/u prn.\par -advised increased hydration \par -advised prompt ER if sx's worsen or new sx's arise\par \par LBP- reports better s/p  recent course of acupuncture. Plans to also see chiropractor soon. \par -seen by ortho 6/22 with PT advised\par -advised Tylenol, heat/ice, massaging\par -advised prompt ER if sx's worsen or new sx's arise\par \par hx chronic HAs, hx vertigo (resolved, likely BPPV)- improved\par -9/21 CT head: unremarkable\par -followed by neurology and on Maxalt prn. F/U pending.\par -advised increased hydration\par -advised prompt medical eval if sx's worsen or new sx's arise\par \par anemia, hx dysmenorrhea, PMS- \par -on Fluoxetine per GYN with good control reported\par -9/21 (ER) Hg 9.8\par -10/21 Hg 10.9, B12/folate/ferritin wnl\par -4/22 cbc/iron studies wnl\par -6/22 Hg 11.1 (stable)\par -followed by GYN, seen 12/21 with negative PAP, states told then no medication indicated\par -followed by hematology, hx IV iron x 3, off oral iron since per heme. F/U pending.\par -hx screening colonoscopy 2/22: hemorrhoids, diverticulosis, rec: repeat in 5 yrs (Dr. De Leon)\par \par grief- mom passed away 5/20/22.  Reports improved.\par -reports good social support\par -declines BH referral\par \par hx low vit d-\par -10/21 level wnl\par \par hx hand arthritis- asx\par -rheum referral given per request\par \par MISC: Continued social distancing and measure for covid19 prevention encouraged. \par -vaccine encouraged\par \par \par HCM\par -hx CPE 10/21, yearly advised\par -screening UA pending- slip given prior to do 1-2 weeks after menses ends\par -declines flu shot\par -declines Tdap\par -declines shingles vaccine\par -hx negative PAP/HPV 12/21 by GYN\par -hx negative mammo 1/22\par -hx screening colonoscopy 2/22: hemorrhoids, diverticulosis, rec: repeat in 5 yrs (Dr. De Leon)\par -derm referral for screening - pending. Regular use of sun block for skin cancer prevention advised.\par \par Pt has prior scheduled appt 10/24/22 for CPE.\par

## 2022-08-29 NOTE — PHYSICAL EXAM
[No Acute Distress] : no acute distress [Well Nourished] : well nourished [Well Developed] : well developed [Normal Oropharynx] : the oropharynx was normal [No Lymphadenopathy] : no lymphadenopathy [Supple] : supple [No Respiratory Distress] : no respiratory distress  [No Accessory Muscle Use] : no accessory muscle use [Clear to Auscultation] : lungs were clear to auscultation bilaterally [Normal Rate] : normal rate  [Regular Rhythm] : with a regular rhythm [Normal S1, S2] : normal S1 and S2 [Pedal Pulses Present] : the pedal pulses are present [No Extremity Clubbing/Cyanosis] : no extremity clubbing/cyanosis [Coordination Grossly Intact] : coordination grossly intact [No Focal Deficits] : no focal deficits [Normal Gait] : normal gait [Normal Affect] : the affect was normal [Alert and Oriented x3] : oriented to person, place, and time [Normal Insight/Judgement] : insight and judgment were intact [No Murmur] : no murmur heard [No Edema] : there was no peripheral edema [Soft] : abdomen soft [Non Tender] : non-tender [No HSM] : no HSM [Normal Supraclavicular Nodes] : no supraclavicular lymphadenopathy [Normal Posterior Cervical Nodes] : no posterior cervical lymphadenopathy [Normal Anterior Cervical Nodes] : no anterior cervical lymphadenopathy [No CVA Tenderness] : no CVA  tenderness [No Spinal Tenderness] : no spinal tenderness [No Joint Swelling] : no joint swelling [Grossly Normal Strength/Tone] : grossly normal strength/tone [No Rash] : no rash

## 2022-08-29 NOTE — DATA REVIEWED
[FreeTextEntry1] : [facility]\par \par 6/6/22\par \par covid/influenza/RVP pcr negative\par d-dimer < 150\par cbc wnl, except Hg 11.1\par cmp wnl\par Trop 4.66\par lipase 106\par mg 2.2\par TSH 2.23\par phos 3.1\par \par cxr: clear lungs\par

## 2022-09-11 ENCOUNTER — NON-APPOINTMENT (OUTPATIENT)
Age: 52
End: 2022-09-11

## 2022-10-13 ENCOUNTER — APPOINTMENT (OUTPATIENT)
Dept: NEUROLOGY | Facility: CLINIC | Age: 52
End: 2022-10-13

## 2022-10-13 VITALS
BODY MASS INDEX: 22.82 KG/M2 | DIASTOLIC BLOOD PRESSURE: 87 MMHG | WEIGHT: 142 LBS | HEART RATE: 73 BPM | TEMPERATURE: 97.8 F | SYSTOLIC BLOOD PRESSURE: 131 MMHG | HEIGHT: 66 IN

## 2022-10-13 PROCEDURE — 99213 OFFICE O/P EST LOW 20 MIN: CPT

## 2022-10-13 NOTE — REVIEW OF SYSTEMS
[Feeling Tired] : feeling tired [Migraine Headache] : migraine headaches [Sleep Disturbances] : sleep disturbances [Negative] : Heme/Lymph

## 2022-10-13 NOTE — HISTORY OF PRESENT ILLNESS
[FreeTextEntry1] : 52 year old woman hx of headaches, here for f/u. Reports 2-3 headaches per week, can be here on arising, always feels tired, head is foggy. Doesn't sleep well, usually naps in early afternoon.  Reports her legs feel uncomfortable when she tries to sleep\par Pain, no numbness no tingling, no cramps, does feel better when she raises her leg up but does interrupt her sleep.\par For when she has headache, she takes Maxalt 10 mg, with good results.  In the past tried nortriptyline which she could not tolerate.  Patient is again not interested in preventative therapy.  Complaining of fatigue, tiredness, poor since having COVID earlier in the year, still does not quite feel right.  Has a follow-up appointment with primary care, and neck.

## 2022-10-13 NOTE — PHYSICAL EXAM
[General Appearance - Alert] : alert [General Appearance - In No Acute Distress] : in no acute distress [Oriented To Time, Place, And Person] : oriented to person, place, and time [Impaired Insight] : insight and judgment were intact [Affect] : the affect was normal [Person] : oriented to person [Place] : oriented to place [Time] : oriented to time [Cranial Nerves Optic (II)] : visual acuity intact bilaterally,  visual fields full to confrontation, pupils equal round and reactive to light [Cranial Nerves Oculomotor (III)] : extraocular motion intact [Cranial Nerves Trigeminal (V)] : facial sensation intact symmetrically [Cranial Nerves Facial (VII)] : face symmetrical [Cranial Nerves Vestibulocochlear (VIII)] : hearing was intact bilaterally [Cranial Nerves Accessory (XI - Cranial And Spinal)] : head turning and shoulder shrug symmetric [Cranial Nerves Hypoglossal (XII)] : there was no tongue deviation with protrusion [Motor Strength] : muscle strength was normal in all four extremities [No Muscle Atrophy] : normal bulk in all four extremities [Sensation Tactile Decrease] : light touch was intact [Abnormal Walk] : normal gait [Balance] : balance was intact [Paresis Pronator Drift Right-Sided] : no pronator drift on the right [Paresis Pronator Drift Left-Sided] : no pronator drift on the left [Past-pointing] : there was no past-pointing [Tremor] : no tremor present

## 2022-10-17 NOTE — ED PROVIDER NOTE - DR. NAME
[FreeTextEntry1] : Pt is here for medication renewal. [de-identified] : 71-year-old female for follow-up hypertension.  During last visit patient was interested in possibly coming off of medication.  Check BPs at home.  Would like to stay on current dose of lisinopril at 5 mg daily.  Feeling well overall.  Davis

## 2022-10-18 ENCOUNTER — APPOINTMENT (OUTPATIENT)
Dept: NEUROLOGY | Facility: CLINIC | Age: 52
End: 2022-10-18

## 2022-10-18 PROCEDURE — 95806 SLEEP STUDY UNATT&RESP EFFT: CPT

## 2022-10-24 ENCOUNTER — APPOINTMENT (OUTPATIENT)
Dept: INTERNAL MEDICINE | Facility: CLINIC | Age: 52
End: 2022-10-24

## 2022-10-24 VITALS
HEART RATE: 81 BPM | RESPIRATION RATE: 16 BRPM | TEMPERATURE: 99.4 F | WEIGHT: 142 LBS | HEIGHT: 66 IN | OXYGEN SATURATION: 99 % | BODY MASS INDEX: 22.82 KG/M2 | DIASTOLIC BLOOD PRESSURE: 86 MMHG | SYSTOLIC BLOOD PRESSURE: 126 MMHG

## 2022-10-24 PROCEDURE — 99396 PREV VISIT EST AGE 40-64: CPT | Mod: 25

## 2022-10-24 PROCEDURE — 36415 COLL VENOUS BLD VENIPUNCTURE: CPT

## 2022-10-24 PROCEDURE — 96127 BRIEF EMOTIONAL/BEHAV ASSMT: CPT

## 2022-10-24 NOTE — REVIEW OF SYSTEMS
[Negative] : Neurological [Fatigue] : fatigue [Fever] : no fever [Chills] : no chills [de-identified] : see HPI

## 2022-10-24 NOTE — PHYSICAL EXAM
[No Acute Distress] : no acute distress [Well Nourished] : well nourished [Well Developed] : well developed [Normal Oropharynx] : the oropharynx was normal [No Lymphadenopathy] : no lymphadenopathy [Supple] : supple [No Respiratory Distress] : no respiratory distress  [No Accessory Muscle Use] : no accessory muscle use [Clear to Auscultation] : lungs were clear to auscultation bilaterally [Normal Rate] : normal rate  [Regular Rhythm] : with a regular rhythm [Normal S1, S2] : normal S1 and S2 [No Murmur] : no murmur heard [Pedal Pulses Present] : the pedal pulses are present [No Edema] : there was no peripheral edema [No Extremity Clubbing/Cyanosis] : no extremity clubbing/cyanosis [Soft] : abdomen soft [Non Tender] : non-tender [No HSM] : no HSM [Normal Supraclavicular Nodes] : no supraclavicular lymphadenopathy [Normal Posterior Cervical Nodes] : no posterior cervical lymphadenopathy [Normal Anterior Cervical Nodes] : no anterior cervical lymphadenopathy [No CVA Tenderness] : no CVA  tenderness [No Spinal Tenderness] : no spinal tenderness [No Joint Swelling] : no joint swelling [Grossly Normal Strength/Tone] : grossly normal strength/tone [No Rash] : no rash [Coordination Grossly Intact] : coordination grossly intact [No Focal Deficits] : no focal deficits [Normal Affect] : the affect was normal [Normal Gait] : normal gait [Alert and Oriented x3] : oriented to person, place, and time [Normal Insight/Judgement] : insight and judgment were intact [Normal Sclera/Conjunctiva] : normal sclera/conjunctiva [PERRL] : pupils equal round and reactive to light [EOMI] : extraocular movements intact [Normal Outer Ear/Nose] : the outer ears and nose were normal in appearance [Normal TMs] : both tympanic membranes were normal [Normal Nasal Mucosa] : the nasal mucosa was normal [Non-distended] : non-distended [No Masses] : no abdominal mass palpated [de-identified] : scattered freckles

## 2022-10-24 NOTE — ASSESSMENT
[FreeTextEntry1] : \par 53 yo F pmhx premenstrual syndrome, dysmenorrhea, anemia, chronic headaches, low vit d, hx covid infection 5/22, s/p ER 9/21 with vertigo here for CPE\par \par \par hx covid infection a/w persistent fatigue, dizziness, shortness of breath, hx transient left sided CP.   Reports self resolved.\par -s/p HH ER eval 6/6/22 with negative w/u\par -6/22 cmp/TSH/d-dimer, RVP negative\par -6/22 Hg 11.1 (stable)\par -6/22 cxr: negative\par -6/22 EKG: NSR @ 74 bpm, incomplete RBBB, nl axis, no LVH/path Q (no sig chg c/w 9/21)\par -followed by cardiology, seen 6/22 thought possibly long-haul covid sx's, advised echo (done 6/22) and EST (done 7/22) F/U pending.\par -hx echo 6/22 EF 55-60%, MVP, mild-mod MR, mild TR, nl LV fxn and size\par -seen by pulmonary 7/22 with prior records reviewed, thought sx's likely post-covid infection related and to improve with time. Advised f/u prn.\par -advised increased hydration \par -advised prompt ER if sx's recur or new sx's arise\par \par LBP- reports better s/p  recent course of acupuncture. \par -seen by ortho 6/22 with PT advised\par -advised Tylenol, heat/ice, massaging\par -advised prompt ER if sx's worsen or new sx's arise\par \par hx chronic HAs, hx vertigo (resolved, likely BPPV)- improved, hx snoring (told recently by daughter)\par -9/21 CT head: unremarkable\par -followed by neurology, last seen 10/22 without med changes made.  Sleep study ordered (done last week, results pending)\par -hx nl dilated exam by optho 10/21, planned to f/u yearly\par -advised increased hydration\par -advised prompt medical eval if sx's worsen or new sx's arise\par \par anemia, hx dysmenorrhea, PMS- \par -on Fluoxetine per GYN with good control reported\par -9/21 (ER) Hg 9.8\par -10/21 Hg 10.9, B12/folate/ferritin wnl\par -4/22 cbc/iron studies wnl\par -6/22 Hg 11.1 (stable)\par -followed by GYN, seen 12/21 with negative PAP, states told then no medication indicated\par -followed by hematology, hx IV iron x 3, off oral iron since per heme. F/U pending.\par -hx screening colonoscopy 2/22: hemorrhoids, diverticulosis, rec: repeat in 5 yrs (Dr. De Leon)\par -check cbc\par \par depression (recent), hx grief (mom passed away 5/20/22)-  Reports improved grief, but recent low mood 2/2 daughter's health issues.  Mod on PHQ 9, denies HI/SI\par -willing for BH referral, placed and encouraged \par -reports good social support\par -advised f/u if worsened\par \par hx vit d def-\par -check level\par \par hx hand arthritis- asx\par -rheum referral given per request, pending 12/22- advised to bring ? supplement visit and discuss\par \par MISC: Continued social distancing and measure for covid19 prevention encouraged. \par -vaccine encouraged\par \par \par HCM\par -check screening labs; declines HIV/STD screening\par -10/21 hep C screening negative\par -declines flu shot\par -declines Tdap\par -declines shingles vaccine\par -hx negative PAP/HPV 12/21 by GYN\par -hx negative mammo 1/22, defers Rx for visit to [facility]\par -hx screening colonoscopy 2/22: hemorrhoids, diverticulosis, rec: repeat in 5 yrs (Dr. De Leon)\par -derm referral for screening - pending 1/23. Regular use of sun block for skin cancer prevention advised.\par -yearly dental screening advised, referral given per request\par

## 2022-10-24 NOTE — HISTORY OF PRESENT ILLNESS
[Health Maintenance] : health maintenance [Lives Alone] : Patient lives alone [] :  [Occupation ___] : occupation: [unfilled] [Never Smoked Cigarettes] : has never smoked cigarettes [Never] : has never used illicit drugs [Good] : good [Reg. Dental Visits] : She has regular dental visits [Healthy Diet] : She consumes a diverse and healthy diet [Premenopausal] : the patient is premenopausal [FreeTextEntry1] : \par CPE [Vision Problems] : She denies vision problems [Hearing Loss] : She denies hearing loss [Regular Exercise] : She does not exercise regularly [de-identified] : 10/21 [de-identified] : 2 adult daughters [de-identified] : Single [de-identified] : declines hx flu shot, hx Tdap > 10 yrs - declines, no hx shingles vaccine [de-identified] : \par 53 yo F pmhx premenstrual syndrome, dysmenorrhea, anemia, chronic headaches, low vit d, hx covid infection 5/22, s/p ER 9/21 with vertigo here for CPE\par \par Feeling well today.\par Does not need med refill.\par Fasting for labs.\par \par Reports is socially distancing and using precautions for covid prevention.\par Denies fever, chills, cough or sob.\par -no hx vaccine, declines\par \par hx covid infection 5/22- hx persistent fatigue limiting activities since covid infection, dizziness, shortness of breath and transient left sided CP.  Reports resolved.\par -is s/p HH ER eval 6/6/22 with negative w/u\par -followed by cardiology, seen 6/22 thought possibly long-haul covid sx's, advised echo (done 6/22) and EST (done 7/22). F/U pending.\par -hx echo 6/22 EF 55-60%, MVP, mild-mod MR, mild TR, nl LV fxn and size\par -seen by pulmonary 7/22 with prior records reviewed, thought sx's likely post-covid infection related and to improve with time. Advised f/u prn.\par -denies dizziness, fatigue, CP or sob.\par -exercise: no formal, stays active\par \par hx grief- reports was improved until recently having low mood/energy 2/2 health issues with daughter.  Not physically limiting.\par -denies HI/SI or anxiety\par \par hx left lower back pain- seen by ortho 6/22 with PT advised.  Reports improved with recent course of acupuncture.  No chiropractor seen as not covered by insurance. \par -rare Aleve use, helps\par -hx sharp pain, focal\par -hx onset with bending, trying to rise or move positions. Resolved with laying flat on back.\par -denies paresthesias, imbalance, incontinence or focal weakness.\par \par hx chronic headaches- notes overall less frequent and less intense\par -on Maxalt prn- taking 1-2x/wk\par -followed by neurology, last seen 10/22 without med changes made.  Sleep study ordered (done last week, results pending)\par -HA: throbbing, frontal, +light/noise sensitivity; no vision loss or dizziness, dysuria or focal weakness\par -resolves with sleep or Excedrin use\par -triggers: dehydration\par -reports nl dilated exam by optho 10/21, planned to f/u yearly \par \par hx anemia, heavy menses- \par -followed by GYN, seen 12/21 with negative PAP, states told then no medication indicated\par -followed by hematology, hx IV iron, off oral iron since per heme. F/u pending.\par -hx screening colonoscopy 2/22: hemorrhoids, diverticulosis, rec: repeat in 5 yrs (Dr. De Leon)\par \par hx PMS- reports controlled\par -on fluoxetine by GYN, taking x 10 yrs- denies SEs.  \par \par Reports hx hand arthritis on/off, requested rheum referral- pending 12/22\par -notes improved with OTC supplement use (? name)\par \par Denies  complaints.\par -not sexually active, denies hx STD dx

## 2022-10-24 NOTE — HEALTH RISK ASSESSMENT
[Patient reported mammogram was normal] : Patient reported mammogram was normal [Patient reported PAP Smear was normal] : Patient reported PAP Smear was normal [Smoke Detector] : smoke detector [Carbon Monoxide Detector] : carbon monoxide detector [Seat Belt] :  uses seat belt [Sunscreen] : uses sunscreen [HIV test declined] : HIV test declined [2] : 2) Feeling down, depressed, or hopeless for more than half of the days (2) [PHQ-2 Positive] : PHQ-2 Positive [1/2 of Days or More (2)] : 4.) Feeling tired or having little energy? Half the days or more [Nearly Every Day (3)] : 5.) Poor appetite or overeating? Nearly every day [Not at All (0)] : 8.) Moving or speaking so slowly that other people could have noticed, or the opposite, moving or speaking faster than usual? Not at all [Moderate] : severity of depression is moderate [Somewhat Difficult] : How difficult have these problems made it for you to do your work, take care of things at home, or get along with people? Somewhat difficult [PHQ-9 Positive] : PHQ-9 Positive [I have developed a follow-up plan documented below in the note.] : I have developed a follow-up plan documented below in the note. [Feels Safe at Home] : Feels safe at home [[id number]] : 4 [[id number]] : 12 [Guns at Home] : no guns at home [MammogramDate] : 01/22 [PapSmearDate] : 12/21 [ColonoscopyDate] : 02/22 [ColonoscopyComments] :  hemorrhoids, diverticulosis, rec: repeat in 5 yrs (Dr. De Leon) [HIVDate] : 10/21 [HepatitisCDate] : 10/21

## 2022-10-24 NOTE — COUNSELING
Psychiatric Progress Note      DATA:  Subjective/Behavioral Description:  Patient verbalizes: Patient still continues to be delusional about people have stolen his identity.  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  He is pleased with her    MEDICATIONS:  Current Facility-Administered Medications   Medication Dose Route Frequency Provider Last Rate Last Admin   • ziprasidone (GEODON) capsule 40 mg  40 mg Oral BID WC Italo Jain MD   40 mg at 04/25/21 0811   • benztropine mesylate (COGENTIN) 1 MG/ML injection 1 mg  1 mg Intramuscular Q8H PRN Italo Jain MD        Or   • benztropine (COGENTIN) tablet 1 mg  1 mg Oral Q8H PRN Italo Jain MD       • hydrOXYzine (ATARAX) tablet 50 mg  50 mg Oral Q4H PRN Italo Jain MD   50 mg at 04/22/21 2012   • LORazepam (ATIVAN) injection 1 mg  1 mg Intramuscular Q6H PRN Italo Jain MD        Or   • LORazepam (ATIVAN) tablet 1 mg  1 mg Oral Q6H PRN Italo Jain MD   1 mg at 04/25/21 1802   • acetaminophen (TYLENOL) tablet 650 mg  650 mg Oral Q4H PRN Italo Jain MD   650 mg at 04/23/21 1813   • aluminum-magnesium hydroxide-simethicone (MAALOX) 200-200-20 MG/5ML suspension 30 mL  30 mL Oral Q4H PRN Italo Jain MD   30 mL at 04/24/21 1757   • nicotine polacrilex (NICORETTE) gum 2 mg  2 mg Oral Q1H PRN Italo Jain MD   2 mg at 04/22/21 2054   • nicotine (NICODERM) 21 MG/24HR patch 1 patch  1 patch Transdermal Daily Italo Jain MD   1 patch at 04/25/21 0811   • traZODone (DESYREL) tablet 50 mg  50 mg Oral Nightly PRN Italo Jain MD   50 mg at 04/22/21 2012   • gabapentin (NEURONTIN) capsule 600 mg  600 mg Oral 3 times per day Italo Jain MD   600 mg at 04/25/21 1429   • haloperidol (HALDOL) tablet 5 mg  5 mg Oral Q4H  PRN Italo Jain MD       • haloperidol lactate (HALDOL) 5 MG/ML injection 5 mg  5 mg Intravenous Q4H PRN Italo Jain MD           LABS:    Admission on 04/22/2021   Component Date Value Ref Range Status   • Sodium 04/22/2021 133* 135 - 145 mmol/L Final   • Potassium 04/22/2021 4.3  3.4 - 5.1 mmol/L Final   • Chloride 04/22/2021 101  98 - 107 mmol/L Final   • Carbon Dioxide 04/22/2021 24  21 - 32 mmol/L Final   • Anion Gap 04/22/2021 12  10 - 20 mmol/L Final   • Glucose 04/22/2021 119* 65 - 99 mg/dL Final   • BUN 04/22/2021 12  6 - 20 mg/dL Final   • Creatinine 04/22/2021 0.75  0.67 - 1.17 mg/dL Final   • Glomerular Filtration Rate 04/22/2021 >90  >90 mL/min/1.73m2 Final    eGFR results = or >90 mL/min/1.73m2 = Normal kidney function.   • BUN/ Creatinine Ratio 04/22/2021 16  7 - 25 Final   • Calcium 04/22/2021 9.6  8.4 - 10.2 mg/dL Final   • Alcohol 04/22/2021 None Detected  None Detected mg/dL Final   • Amphetamines, Urine 04/22/2021 Negative  Negative Final    Cutoff = 500 ng/mL   • Barbiturates, Urine 04/22/2021 Negative  Negative Final    Cutoff = 200 ng/mL   • Benzodiazepines, Urine 04/22/2021 Negative  Negative Final    Cutoff = 200 ng/mL   • Cocaine/ Metabolite, Urine 04/22/2021 Negative  Negative Final    Cutoff = 150 ng/ml   • Opiates, Urine 04/22/2021 Negative  Negative Final    Cutoff = 300 ng/mL   • Phencyclidine, Urine 04/22/2021 Negative  Negative Final    Cutoff = 25 ng/mL   • Cannabinoids, Urine 04/22/2021 Positive* Negative Final    Cutoff = 50 ng/mL   • Rapid SARS-COV-2 by PCR 04/22/2021 Not Detected  Not Detected / Detected / Presumptive Positive / Inhibitors present Final   • Isolation Guidelines 04/22/2021    Final    Do not use this test result as the sole decision-maker for discontinuation of isolation.   Clinical evaluation should be considered for other respiratory illness requiring transmission-based isolation.    •       No fever (<99.0 F/37.2 C) for at least 24 hours without the  [Behavioral health counseling provided] : Behavioral health counseling provided use of fever-reducing medications    AND  •       Respiratory symptoms have improved or resolved (e.g. cough, shortness of breath)     AND  •       COVID-19 negative test    See COVID-19 Deisolation Resource Guide   • Procedural Comment 04/22/2021    Final    SARS-CoV-2 nucleic acid has not been detected indicating the absence of COVID-19.       Testing was performed using the [employer] Xpert Xpress SARS-CoV-2 RT-PCR assay that has been given Emergency Use Authorization (EUA) by the [place] Food and Drug Administration (FDA).  These results are considered definitive and do not need to be confirmed by another method.   • WBC 04/22/2021 5.7  4.2 - 11.0 K/mcL Final   • RBC 04/22/2021 4.91  4.50 - 5.90 mil/mcL Final   • HGB 04/22/2021 15.2  13.0 - 17.0 g/dL Final   • HCT 04/22/2021 43.5  39.0 - 51.0 % Final   • MCV 04/22/2021 88.6  78.0 - 100.0 fl Final   • MCH 04/22/2021 31.0  26.0 - 34.0 pg Final   • MCHC 04/22/2021 34.9  32.0 - 36.5 g/dL Final   • RDW-CV 04/22/2021 12.1  11.0 - 15.0 % Final   • RDW-SD 04/22/2021 39.0  39.0 - 50.0 fL Final   • PLT 04/22/2021 198  140 - 450 K/mcL Final   • NRBC 04/22/2021 0  <=0 /100 WBC Final   • Neutrophil, Percent 04/22/2021 64  % Final   • Lymphocytes, Percent 04/22/2021 22  % Final   • Mono, Percent 04/22/2021 12  % Final   • Eosinophils, Percent 04/22/2021 1  % Final   • Basophils, Percent 04/22/2021 1  % Final   • Immature Granulocytes 04/22/2021 0  % Final   • Absolute Neutrophils 04/22/2021 3.6  1.8 - 7.7 K/mcL Final   • Absolute Lymphocytes 04/22/2021 1.3  1.0 - 4.0 K/mcL Final   • Absolute Monocytes 04/22/2021 0.7  0.3 - 0.9 K/mcL Final   • Absolute Eosinophils  04/22/2021 0.1  0.0 - 0.5 K/mcL Final   • Absolute Basophils 04/22/2021 0.0  0.0 - 0.3 K/mcL Final   • Absolute Immmature Granulocytes 04/22/2021 0.0  0.0 - 0.2 K/mcL Final   • Extra Tube 04/22/2021 Hold for Add Ons   Final   • Extra Tube 04/22/2021 Hold for Add Ons   Final   • Extra Tube 04/22/2021 Hold for Add  Ons   Final   • Extra Tube 04/22/2021 Hold for Add Ons   Final       Problem List Items Addressed This Visit     None      Visit Diagnoses     Acute psychosis ([facility])    -  Primary          PLAN:   A. Behavioral: present care plan .  Inpatient    C. Disposition: DC plan.  Per social service    B. Medication: Continue meds       Medication change rationale:      No change

## 2022-10-25 ENCOUNTER — NON-APPOINTMENT (OUTPATIENT)
Age: 52
End: 2022-10-25

## 2022-10-25 ENCOUNTER — TRANSCRIPTION ENCOUNTER (OUTPATIENT)
Age: 52
End: 2022-10-25

## 2022-10-26 ENCOUNTER — RX RENEWAL (OUTPATIENT)
Age: 52
End: 2022-10-26

## 2022-10-26 LAB
25(OH)D3 SERPL-MCNC: 40.8 NG/ML
ALBUMIN SERPL ELPH-MCNC: 4.4 G/DL
ALP BLD-CCNC: 71 U/L
ALT SERPL-CCNC: 15 U/L
ANION GAP SERPL CALC-SCNC: 12 MMOL/L
APPEARANCE: CLEAR
AST SERPL-CCNC: 23 U/L
BACTERIA: NEGATIVE
BASOPHILS # BLD AUTO: 0.04 K/UL
BASOPHILS NFR BLD AUTO: 0.8 %
BILIRUB SERPL-MCNC: 0.2 MG/DL
BILIRUBIN URINE: NEGATIVE
BLOOD URINE: NEGATIVE
BUN SERPL-MCNC: 10 MG/DL
CALCIUM SERPL-MCNC: 9.1 MG/DL
CHLORIDE SERPL-SCNC: 103 MMOL/L
CHOLEST SERPL-MCNC: 194 MG/DL
CO2 SERPL-SCNC: 25 MMOL/L
COLOR: NORMAL
CREAT SERPL-MCNC: 0.83 MG/DL
EGFR: 85 ML/MIN/1.73M2
EOSINOPHIL # BLD AUTO: 0.09 K/UL
EOSINOPHIL NFR BLD AUTO: 1.9 %
ESTIMATED AVERAGE GLUCOSE: 105 MG/DL
FERRITIN SERPL-MCNC: 4 NG/ML
FOLATE SERPL-MCNC: 15.2 NG/ML
GLUCOSE QUALITATIVE U: NEGATIVE
GLUCOSE SERPL-MCNC: 85 MG/DL
HBA1C MFR BLD HPLC: 5.3 %
HCT VFR BLD CALC: 30.4 %
HDLC SERPL-MCNC: 74 MG/DL
HGB BLD-MCNC: 9.3 G/DL
HYALINE CASTS: 0 /LPF
IMM GRANULOCYTES NFR BLD AUTO: 0.4 %
IRON SATN MFR SERPL: 4 %
IRON SERPL-MCNC: 19 UG/DL
KETONES URINE: NEGATIVE
LDLC SERPL CALC-MCNC: 109 MG/DL
LEUKOCYTE ESTERASE URINE: NEGATIVE
LYMPHOCYTES # BLD AUTO: 1.26 K/UL
LYMPHOCYTES NFR BLD AUTO: 26.5 %
MAN DIFF?: NORMAL
MCHC RBC-ENTMCNC: 24.5 PG
MCHC RBC-ENTMCNC: 30.6 GM/DL
MCV RBC AUTO: 80 FL
MICROSCOPIC-UA: NORMAL
MONOCYTES # BLD AUTO: 0.37 K/UL
MONOCYTES NFR BLD AUTO: 7.8 %
NEUTROPHILS # BLD AUTO: 2.97 K/UL
NEUTROPHILS NFR BLD AUTO: 62.6 %
NITRITE URINE: NEGATIVE
NONHDLC SERPL-MCNC: 120 MG/DL
PH URINE: 5.5
PLATELET # BLD AUTO: 376 K/UL
POTASSIUM SERPL-SCNC: 4.2 MMOL/L
PROT SERPL-MCNC: 6.8 G/DL
PROTEIN URINE: NEGATIVE
RBC # BLD: 3.8 M/UL
RBC # FLD: 13.2 %
RED BLOOD CELLS URINE: 0 /HPF
SODIUM SERPL-SCNC: 140 MMOL/L
SPECIFIC GRAVITY URINE: 1.01
SQUAMOUS EPITHELIAL CELLS: 0 /HPF
TIBC SERPL-MCNC: 440 UG/DL
TRIGL SERPL-MCNC: 55 MG/DL
TSH SERPL-ACNC: 3.12 UIU/ML
UIBC SERPL-MCNC: 421 UG/DL
UROBILINOGEN URINE: NORMAL
VIT B12 SERPL-MCNC: 443 PG/ML
WBC # FLD AUTO: 4.75 K/UL
WHITE BLOOD CELLS URINE: 0 /HPF

## 2022-10-27 ENCOUNTER — NON-APPOINTMENT (OUTPATIENT)
Age: 52
End: 2022-10-27

## 2022-10-27 DIAGNOSIS — Z86.16 PERSONAL HISTORY OF COVID-19: ICD-10-CM

## 2022-10-27 DIAGNOSIS — Z87.19 PERSONAL HISTORY OF OTHER DISEASES OF THE DIGESTIVE SYSTEM: ICD-10-CM

## 2022-10-27 DIAGNOSIS — Z87.898 PERSONAL HISTORY OF OTHER SPECIFIED CONDITIONS: ICD-10-CM

## 2022-10-27 DIAGNOSIS — U07.1 COVID-19: ICD-10-CM

## 2022-10-28 PROBLEM — U07.1 COVID-19 VIRUS INFECTION: Status: RESOLVED | Noted: 2022-06-06 | Resolved: 2022-06-06

## 2022-10-28 PROBLEM — Z87.898 HISTORY OF SHORTNESS OF BREATH: Status: RESOLVED | Noted: 2022-06-06 | Resolved: 2022-08-29

## 2022-10-28 PROBLEM — Z87.898 HISTORY OF CHEST PAIN: Status: ACTIVE | Noted: 2022-06-07

## 2022-10-28 PROBLEM — Z87.19 HISTORY OF HEMORRHOIDS: Status: ACTIVE | Noted: 2022-04-17

## 2022-10-28 PROBLEM — Z86.16 HISTORY OF COVID-19: Status: RESOLVED | Noted: 2022-07-11 | Resolved: 2022-10-28

## 2022-10-29 ENCOUNTER — OUTPATIENT (OUTPATIENT)
Dept: OUTPATIENT SERVICES | Facility: HOSPITAL | Age: 52
LOS: 1 days | Discharge: HOME | End: 2022-10-29

## 2022-10-29 DIAGNOSIS — D50.9 IRON DEFICIENCY ANEMIA, UNSPECIFIED: ICD-10-CM

## 2022-11-01 ENCOUNTER — APPOINTMENT (OUTPATIENT)
Dept: HEMATOLOGY ONCOLOGY | Facility: CLINIC | Age: 52
End: 2022-11-01

## 2022-11-01 ENCOUNTER — APPOINTMENT (OUTPATIENT)
Dept: INFUSION THERAPY | Facility: CLINIC | Age: 52
End: 2022-11-01

## 2022-11-01 PROCEDURE — 99213 OFFICE O/P EST LOW 20 MIN: CPT

## 2022-11-02 DIAGNOSIS — N92.0 EXCESSIVE AND FREQUENT MENSTRUATION WITH REGULAR CYCLE: ICD-10-CM

## 2022-11-02 DIAGNOSIS — D50.0 IRON DEFICIENCY ANEMIA SECONDARY TO BLOOD LOSS (CHRONIC): ICD-10-CM

## 2022-11-02 NOTE — PHYSICAL EXAM
[Normal] : full range of motion and no deformities appreciated [de-identified] : anicteric [de-identified] : no c/c/e [de-identified] : no rashes

## 2022-11-02 NOTE — ASSESSMENT
[FreeTextEntry1] : Patient is a 52 y.o.F with a recurrent iron deficiency anemia.    \par GI eval 2/18/22 - routine colonoscopy negative for bleeding. Hgb electrophoresis was normal. \par Iron deficiency attributed to ongoing menstrual blood losses\par \par Responded to oral iron but due to GI side effects (constipation)  she is not able to increase iron dose to fully replenish iron stores.\par Had IV iron Spring 2022, but only 1 out of 2 planned doses due to social issues. \par Now with anemia and severe iron deficiency.  \par Plan:  IV iron with Feraheme x 3, then labs 1 month after to assess response.  \par Advised to pay attention to RLS - may be 1st sign of iron deficiency.  \par Check iron studies as soon as this is exacerbated. \par \par  \par PCP ZENOBIA SHAIKH \par \par  \par

## 2022-11-02 NOTE — HISTORY OF PRESENT ILLNESS
[de-identified] : TOMASA VALLEJO is a 52 y.o. F with a PMH significant for chronic headaches, who we are following for recurrent MILKA. \par \par 10/21/21 - Hgb: 10.9,  MCV: 91.1,  B12: 730,  Folate: 7.0,  Ferritin: 19, TSAT: 68%\par 11/29/21 - Initially seen in our office.  Reported history of chronic anemia for many years. \par Her Hgb was down to 7 in the  past. She has been taking Ferrous sulfate 65 mg of elemental iron daily for at least three years (takes on empty stomach). \par Did not tolerate twice daily iron - constipation. \par Menses heavy for years. Has two children. Chronic fatigue.  No GI c/o.\par No family hx of anemia.   \par Etiology of her iron deficiency was attributed to ongoing menstrual blood losses.\par Given IV Feraheme x 2 - Last 1 month after with Hgb: 12.0, Ferritin 244 \par \par 2/18/22 - Colonoscopy ([facility] De [facility]) - Large hemorrhoids, non-bleeding diverticula. \par \par 4/18/22 - Hgb: 12.0, but Ferritin down to 18.  Was advised to come for Feraheme x 2 but only came for 1 infusion, no f/u after. \par  [de-identified] : Patient reports didn't come for 2nd IV iron infusion in spring as he mom got sick, then .  She then had COVID. \par \par Began to feel tired.  Then started a new antioxidant supplement with nitrous oxide - since starting has felt great - decreased brain fog, decreased fatigue. \par Then 2 weeks ago felt "down".  This past week started to drag.  Finding it harder to wake up in the mornings. \par \par 10/24/22 -  WBC: 4.75,  Hgb: 9.3,  Hct: 30.4,  MCV: 80.0,  Plts: 376,  Ferritin: 4, TSAT: 4,  B12: 443,  Folate: 15.2\par Was referred back to our office for IV iron. \par \par Asked about s/s iron deficiency - does admit to severe RLS - stated even saw neurology for this.  \par Admits that periods are still very heavy. \par \par Denies any other changes in her family, medical, or social history since her last visit of 21.

## 2022-11-02 NOTE — RESULTS/DATA
[FreeTextEntry1] : 10/24/22 -  WBC: 4.75,  Hgb: 9.3,  Hct: 30.4,  MCV: 80.0,  Plts: 376,  Ferritin: 4, TSAT: 4,  B12: 443,  Folate: 15.2

## 2022-11-04 ENCOUNTER — TRANSCRIPTION ENCOUNTER (OUTPATIENT)
Age: 52
End: 2022-11-04

## 2022-11-04 ENCOUNTER — NON-APPOINTMENT (OUTPATIENT)
Age: 52
End: 2022-11-04

## 2022-11-07 ENCOUNTER — TRANSCRIPTION ENCOUNTER (OUTPATIENT)
Age: 52
End: 2022-11-07

## 2022-11-09 ENCOUNTER — APPOINTMENT (OUTPATIENT)
Dept: INFUSION THERAPY | Facility: CLINIC | Age: 52
End: 2022-11-09

## 2022-11-09 VITALS
OXYGEN SATURATION: 99 % | DIASTOLIC BLOOD PRESSURE: 81 MMHG | SYSTOLIC BLOOD PRESSURE: 117 MMHG | TEMPERATURE: 98.3 F | RESPIRATION RATE: 18 BRPM | HEART RATE: 75 BPM

## 2022-11-15 ENCOUNTER — APPOINTMENT (OUTPATIENT)
Dept: INFUSION THERAPY | Facility: CLINIC | Age: 52
End: 2022-11-15

## 2022-11-15 VITALS
DIASTOLIC BLOOD PRESSURE: 82 MMHG | HEART RATE: 77 BPM | WEIGHT: 146.5 LBS | BODY MASS INDEX: 23.54 KG/M2 | SYSTOLIC BLOOD PRESSURE: 130 MMHG | RESPIRATION RATE: 18 BRPM | HEIGHT: 66 IN | OXYGEN SATURATION: 98 % | TEMPERATURE: 98.7 F

## 2022-11-20 NOTE — ED PROVIDER NOTE - SKIN, MLM
S/p I&D on 11/20/22  Follow up cultures  Possible home within 2 days with abx      Skin normal color for race, warm, dry and intact. No evidence of rash.

## 2022-11-22 ENCOUNTER — APPOINTMENT (OUTPATIENT)
Dept: INFUSION THERAPY | Facility: CLINIC | Age: 52
End: 2022-11-22

## 2022-11-29 ENCOUNTER — APPOINTMENT (OUTPATIENT)
Dept: INFUSION THERAPY | Facility: CLINIC | Age: 52
End: 2022-11-29

## 2022-12-07 ENCOUNTER — APPOINTMENT (OUTPATIENT)
Dept: RHEUMATOLOGY | Facility: CLINIC | Age: 52
End: 2022-12-07

## 2022-12-07 VITALS
OXYGEN SATURATION: 100 % | DIASTOLIC BLOOD PRESSURE: 88 MMHG | HEART RATE: 84 BPM | HEIGHT: 66 IN | WEIGHT: 143 LBS | BODY MASS INDEX: 22.98 KG/M2 | SYSTOLIC BLOOD PRESSURE: 120 MMHG | TEMPERATURE: 96.8 F

## 2022-12-07 DIAGNOSIS — Z98.1 ARTHRODESIS STATUS: ICD-10-CM

## 2022-12-07 PROCEDURE — 99204 OFFICE O/P NEW MOD 45 MIN: CPT

## 2022-12-08 ENCOUNTER — APPOINTMENT (OUTPATIENT)
Dept: RADIOLOGY | Facility: CLINIC | Age: 52
End: 2022-12-08
Payer: MEDICAID

## 2022-12-08 ENCOUNTER — OUTPATIENT (OUTPATIENT)
Dept: OUTPATIENT SERVICES | Facility: HOSPITAL | Age: 52
LOS: 1 days | End: 2022-12-08
Payer: MEDICAID

## 2022-12-08 ENCOUNTER — LABORATORY RESULT (OUTPATIENT)
Age: 52
End: 2022-12-08

## 2022-12-08 DIAGNOSIS — M19.049 PRIMARY OSTEOARTHRITIS, UNSPECIFIED HAND: ICD-10-CM

## 2022-12-08 DIAGNOSIS — M54.50 LOW BACK PAIN, UNSPECIFIED: ICD-10-CM

## 2022-12-08 PROCEDURE — 73110 X-RAY EXAM OF WRIST: CPT | Mod: 26,50

## 2022-12-08 PROCEDURE — 72050 X-RAY EXAM NECK SPINE 4/5VWS: CPT | Mod: 26

## 2022-12-08 PROCEDURE — 73110 X-RAY EXAM OF WRIST: CPT

## 2022-12-08 PROCEDURE — 73130 X-RAY EXAM OF HAND: CPT | Mod: 26,50

## 2022-12-08 PROCEDURE — 72050 X-RAY EXAM NECK SPINE 4/5VWS: CPT

## 2022-12-08 PROCEDURE — 73130 X-RAY EXAM OF HAND: CPT

## 2022-12-09 LAB
CRP SERPL-MCNC: <3 MG/L
ERYTHROCYTE [SEDIMENTATION RATE] IN BLOOD BY WESTERGREN METHOD: 7 MM/HR
RHEUMATOID FACT SER QL: <10 IU/ML

## 2022-12-12 ENCOUNTER — APPOINTMENT (OUTPATIENT)
Dept: HEMATOLOGY ONCOLOGY | Facility: CLINIC | Age: 52
End: 2022-12-12

## 2022-12-12 ENCOUNTER — RESULT REVIEW (OUTPATIENT)
Age: 52
End: 2022-12-12

## 2022-12-12 LAB
ALBUMIN MFR SERPL ELPH: 62.7 %
ALBUMIN SERPL-MCNC: 4.5 G/DL
ALBUMIN/GLOB SERPL: 1.7 RATIO
ALPHA1 GLOB MFR SERPL ELPH: 3.6 %
ALPHA1 GLOB SERPL ELPH-MCNC: 0.3 G/DL
ALPHA2 GLOB MFR SERPL ELPH: 8.3 %
ALPHA2 GLOB SERPL ELPH-MCNC: 0.6 G/DL
B-GLOBULIN MFR SERPL ELPH: 10.4 %
B-GLOBULIN SERPL ELPH-MCNC: 0.7 G/DL
BASOPHILS # BLD AUTO: 0.03 K/UL — SIGNIFICANT CHANGE UP (ref 0–0.2)
BASOPHILS NFR BLD AUTO: 0.5 % — SIGNIFICANT CHANGE UP (ref 0–2)
CCP AB SER IA-ACNC: <8 UNITS
EOSINOPHIL # BLD AUTO: 0.08 K/UL — SIGNIFICANT CHANGE UP (ref 0–0.5)
EOSINOPHIL NFR BLD AUTO: 1.5 % — SIGNIFICANT CHANGE UP (ref 0–6)
GAMMA GLOB FLD ELPH-MCNC: 1.1 G/DL
GAMMA GLOB MFR SERPL ELPH: 15 %
HCT VFR BLD CALC: 40.3 % — SIGNIFICANT CHANGE UP (ref 34.5–45)
HGB BLD-MCNC: 13 G/DL — SIGNIFICANT CHANGE UP (ref 11.5–15.5)
IMM GRANULOCYTES NFR BLD AUTO: 0.4 % — SIGNIFICANT CHANGE UP (ref 0–0.9)
INTERPRETATION SERPL IEP-IMP: NORMAL
LYMPHOCYTES # BLD AUTO: 1.21 K/UL — SIGNIFICANT CHANGE UP (ref 1–3.3)
LYMPHOCYTES # BLD AUTO: 22 % — SIGNIFICANT CHANGE UP (ref 13–44)
M PROTEIN SPEC IFE-MCNC: NORMAL
MCHC RBC-ENTMCNC: 26.5 PG — LOW (ref 27–34)
MCHC RBC-ENTMCNC: 32.3 GM/DL — SIGNIFICANT CHANGE UP (ref 32–36)
MCV RBC AUTO: 82.1 FL — SIGNIFICANT CHANGE UP (ref 80–100)
MONOCYTES # BLD AUTO: 0.32 K/UL — SIGNIFICANT CHANGE UP (ref 0–0.9)
MONOCYTES NFR BLD AUTO: 5.8 % — SIGNIFICANT CHANGE UP (ref 2–14)
NEUTROPHILS # BLD AUTO: 3.83 K/UL — SIGNIFICANT CHANGE UP (ref 1.8–7.4)
NEUTROPHILS NFR BLD AUTO: 69.8 % — SIGNIFICANT CHANGE UP (ref 43–77)
NRBC # BLD: 0 /100 WBCS — SIGNIFICANT CHANGE UP (ref 0–0)
PLATELET # BLD AUTO: 200 K/UL — SIGNIFICANT CHANGE UP (ref 150–400)
PROT SERPL-MCNC: 7.1 G/DL
PROT SERPL-MCNC: 7.1 G/DL
RBC # BLD: 4.91 M/UL — SIGNIFICANT CHANGE UP (ref 3.8–5.2)
RBC # FLD: 20 % — HIGH (ref 10.3–14.5)
RF+CCP IGG SER-IMP: NEGATIVE
WBC # BLD: 5.49 K/UL — SIGNIFICANT CHANGE UP (ref 3.8–10.5)
WBC # FLD AUTO: 5.49 K/UL — SIGNIFICANT CHANGE UP (ref 3.8–10.5)

## 2022-12-13 LAB
FERRITIN SERPL-MCNC: 364 NG/ML — HIGH (ref 15–150)
IRON SATN MFR SERPL: 121 UG/DL — SIGNIFICANT CHANGE UP (ref 30–160)
IRON SATN MFR SERPL: 45 % — SIGNIFICANT CHANGE UP (ref 14–50)
TIBC SERPL-MCNC: 271 UG/DL — SIGNIFICANT CHANGE UP (ref 220–430)
UIBC SERPL-MCNC: 150 UG/DL — SIGNIFICANT CHANGE UP (ref 110–370)

## 2022-12-14 LAB
ANA PAT FLD IF-IMP: NORMAL
ANA SER IF-ACNC: ABNORMAL

## 2022-12-16 LAB — G6PD SER-CCNC: 14.7 U/G HGB

## 2022-12-21 ENCOUNTER — RESULT REVIEW (OUTPATIENT)
Age: 52
End: 2022-12-21

## 2022-12-21 ENCOUNTER — APPOINTMENT (OUTPATIENT)
Dept: HEMATOLOGY ONCOLOGY | Facility: CLINIC | Age: 52
End: 2022-12-21

## 2022-12-21 ENCOUNTER — NON-APPOINTMENT (OUTPATIENT)
Age: 52
End: 2022-12-21

## 2022-12-21 LAB
BASOPHILS # BLD AUTO: 0.02 K/UL — SIGNIFICANT CHANGE UP (ref 0–0.2)
BASOPHILS NFR BLD AUTO: 0.5 % — SIGNIFICANT CHANGE UP (ref 0–2)
EOSINOPHIL # BLD AUTO: 0.09 K/UL — SIGNIFICANT CHANGE UP (ref 0–0.5)
EOSINOPHIL NFR BLD AUTO: 2.1 % — SIGNIFICANT CHANGE UP (ref 0–6)
FERRITIN SERPL-MCNC: 336 NG/ML — HIGH (ref 15–150)
HCT VFR BLD CALC: 40.4 % — SIGNIFICANT CHANGE UP (ref 34.5–45)
HGB BLD-MCNC: 13.2 G/DL — SIGNIFICANT CHANGE UP (ref 11.5–15.5)
IMM GRANULOCYTES NFR BLD AUTO: 0.5 % — SIGNIFICANT CHANGE UP (ref 0–0.9)
IRON SATN MFR SERPL: 102 UG/DL — SIGNIFICANT CHANGE UP (ref 30–160)
IRON SATN MFR SERPL: 37 % — SIGNIFICANT CHANGE UP (ref 14–50)
LYMPHOCYTES # BLD AUTO: 1.16 K/UL — SIGNIFICANT CHANGE UP (ref 1–3.3)
LYMPHOCYTES # BLD AUTO: 27.7 % — SIGNIFICANT CHANGE UP (ref 13–44)
MCHC RBC-ENTMCNC: 26.3 PG — LOW (ref 27–34)
MCHC RBC-ENTMCNC: 32.7 GM/DL — SIGNIFICANT CHANGE UP (ref 32–36)
MCV RBC AUTO: 80.6 FL — SIGNIFICANT CHANGE UP (ref 80–100)
MONOCYTES # BLD AUTO: 0.39 K/UL — SIGNIFICANT CHANGE UP (ref 0–0.9)
MONOCYTES NFR BLD AUTO: 9.3 % — SIGNIFICANT CHANGE UP (ref 2–14)
NEUTROPHILS # BLD AUTO: 2.51 K/UL — SIGNIFICANT CHANGE UP (ref 1.8–7.4)
NEUTROPHILS NFR BLD AUTO: 59.9 % — SIGNIFICANT CHANGE UP (ref 43–77)
NRBC # BLD: 0 /100 WBCS — SIGNIFICANT CHANGE UP (ref 0–0)
PLATELET # BLD AUTO: 207 K/UL — SIGNIFICANT CHANGE UP (ref 150–400)
RBC # BLD: 5.01 M/UL — SIGNIFICANT CHANGE UP (ref 3.8–5.2)
RBC # FLD: 19.4 % — HIGH (ref 10.3–14.5)
TIBC SERPL-MCNC: 276 UG/DL — SIGNIFICANT CHANGE UP (ref 220–430)
UIBC SERPL-MCNC: 174 UG/DL — SIGNIFICANT CHANGE UP (ref 110–370)
WBC # BLD: 4.19 K/UL — SIGNIFICANT CHANGE UP (ref 3.8–10.5)
WBC # FLD AUTO: 4.19 K/UL — SIGNIFICANT CHANGE UP (ref 3.8–10.5)

## 2022-12-22 LAB — ERYTHROCYTE [SEDIMENTATION RATE] IN BLOOD: 19 MM/HR — SIGNIFICANT CHANGE UP (ref 0–20)

## 2022-12-27 ENCOUNTER — NON-APPOINTMENT (OUTPATIENT)
Age: 52
End: 2022-12-27

## 2022-12-28 ENCOUNTER — RX RENEWAL (OUTPATIENT)
Age: 52
End: 2022-12-28

## 2022-12-28 ENCOUNTER — APPOINTMENT (OUTPATIENT)
Dept: RHEUMATOLOGY | Facility: CLINIC | Age: 52
End: 2022-12-28

## 2022-12-28 VITALS
HEIGHT: 66 IN | OXYGEN SATURATION: 100 % | SYSTOLIC BLOOD PRESSURE: 160 MMHG | BODY MASS INDEX: 22.66 KG/M2 | HEART RATE: 63 BPM | TEMPERATURE: 98.1 F | DIASTOLIC BLOOD PRESSURE: 100 MMHG | WEIGHT: 141 LBS

## 2022-12-28 DIAGNOSIS — R76.8 OTHER SPECIFIED ABNORMAL IMMUNOLOGICAL FINDINGS IN SERUM: ICD-10-CM

## 2022-12-28 PROCEDURE — 99214 OFFICE O/P EST MOD 30 MIN: CPT

## 2023-01-03 ENCOUNTER — APPOINTMENT (OUTPATIENT)
Dept: DERMATOLOGY | Facility: CLINIC | Age: 53
End: 2023-01-03
Payer: MEDICAID

## 2023-01-03 ENCOUNTER — NON-APPOINTMENT (OUTPATIENT)
Age: 53
End: 2023-01-03

## 2023-01-03 DIAGNOSIS — L85.8 OTHER SPECIFIED EPIDERMAL THICKENING: ICD-10-CM

## 2023-01-03 DIAGNOSIS — Z12.83 ENCOUNTER FOR SCREENING FOR MALIGNANT NEOPLASM OF SKIN: ICD-10-CM

## 2023-01-03 DIAGNOSIS — R20.2 PARESTHESIA OF SKIN: ICD-10-CM

## 2023-01-03 DIAGNOSIS — L82.1 OTHER SEBORRHEIC KERATOSIS: ICD-10-CM

## 2023-01-03 PROCEDURE — 99204 OFFICE O/P NEW MOD 45 MIN: CPT

## 2023-01-03 RX ORDER — TIZANIDINE 4 MG/1
4 TABLET ORAL
Qty: 30 | Refills: 1 | Status: DISCONTINUED | COMMUNITY
Start: 2022-12-28 | End: 2023-01-03

## 2023-01-03 NOTE — PHYSICAL EXAM
[Alert] : alert [Oriented x 3] : ~L oriented x 3 [Well Nourished] : well nourished [Conjunctiva Non-injected] : conjunctiva non-injected [No Visual Lymphadenopathy] : no visual  lymphadenopathy [No Clubbing] : no clubbing [No Edema] : no edema [No Bromhidrosis] : no bromhidrosis [No Chromhidrosis] : no chromhidrosis [Full Body Skin Exam Performed] : performed [FreeTextEntry3] : General: Alert and oriented, in NAD. \par All of the following were examined and were within normal limits, except as noted:  \par Scalp:\par Face, including eyelids, nose, lips, ears, oropharynx:\par Neck:\par Chest/Back/Abdomen:\par Bilateral Arms/Hands:\par Bilateral Legs/Feet:\par Buttocks, Genitalia, Anus/perineum:  	\par Hair, Nails, Oral Mucosa, Eyes:  \par \par hyperpigmented patch R midback\par stuckon waxy brown papules\par keratotic pink papules on bl thighs\par \par

## 2023-01-03 NOTE — HISTORY OF PRESENT ILLNESS
[FreeTextEntry1] : spots; itching [de-identified] : Referred by: Dr. HAWA QURESHI,ZENOBIA DEMPSEY \par Ms. TOMSAA VALLEJO is a 52 year old F here for evaluation of below\par \par Problem: itching\par Location: back\par Duration: years\par Associated symptoms/Aggravating Factors: hx of neck tightness and pain\par Modifying factors/Treatments: tried acupuncture. Now getting PT for neck and using topical cortisone PRN itch\par \par FBSE.  Spots scattered on body x years. Asymptomatic and unchanged. No alleviating/aggravating factors. Never been treated.\par No other changing or concerning lesions. \par No itchy, growing, bleeding, painful, or changing moles. 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

## 2023-01-03 NOTE — ASSESSMENT
[FreeTextEntry1] : Screening exam for skin cancer - no suspicious lesions on exam today\par - TBSE performed today\par - Advised sun protection. \par Recommended OTC sunscreen products (EltaMD/Neutrogena/La Roche Posay), including SPF30+ with broadband UV protection as well as proper use. \par Discussed OTC sun protective clothing\par - Counseled patient to monitor for changes, including mole monitoring and self-skin exams\par \par Seborrheic Keratosis\par - These growths are benign\par - Related to genetics - these lesions run in families; NOT related to sun exposure\par - No treatment warranted unless inflamed; can use OTC Sarna lotion PRN itch\par \par Notalgia paresthetica, R mid-back - chronic; exacerbation\par - Diagnosis and treatment options discussed\par We have discussed the nature and course of this condition.\par I have discussed the goals of therapy with the patient.\par We have discussed treatment options and expectations from treatment.\par - Recommend PT for neck +/- back pain\par - Start OTC original Sarna lotion 3-4x daily PRN itch. Keep cool in fridge for better efficacy\par - Start triamcinolone 0.1% cream BID to affected areas for upto 2 weeks on/1-2 week OFF cycles. \par Strong topical steroids should generally not be used on the face, in armpits, or in other skin folds, unless specifically directed otherwise. Overuse of steroids can lead to thinning of the skin, appearance of red blood vessels, or discoloration of the skin. \par \par Keratosis pilaris \par - Benign diagnosis discussed. This is a familial trait, and is not considered a disease. It is caused by plugging of hair follicles with keratin, one of the proteins in the skin.\par - Treatment is cosmetic and for comfort\par - Lotions or creams containing lactic acid or urea can be applied to the affected areas to help with roughness of rash, not with redness. No great gold standard treatment for redness \par - Avoid vigorous scrubbing or rubbing of area as it may worsen condition\par - Start Amlactin 12% cream BID to the affected area - only helps while you are using cream. If you stop the cream, keratosis pilaris will recur\par

## 2023-01-06 LAB
C3 SERPL-MCNC: 98 MG/DL
C4 SERPL-MCNC: 29 MG/DL

## 2023-01-10 LAB
ANA PAT FLD IF-IMP: ABNORMAL
ANA SER IF-ACNC: ABNORMAL
DSDNA AB SER-ACNC: <12 IU/ML
ENA RNP AB SER IA-ACNC: <0.2 AL
ENA SM AB SER IA-ACNC: <0.2 AL
ENA SS-A AB SER IA-ACNC: <0.2 AL
ENA SS-B AB SER IA-ACNC: <0.2 AL
G6PD SER-CCNC: 15.8 U/G HGB
THYROGLOB AB SERPL-ACNC: <20 IU/ML
THYROPEROXIDASE AB SERPL IA-ACNC: <10 IU/ML

## 2023-01-19 ENCOUNTER — NON-APPOINTMENT (OUTPATIENT)
Age: 53
End: 2023-01-19

## 2023-01-24 ENCOUNTER — APPOINTMENT (OUTPATIENT)
Dept: INTERNAL MEDICINE | Facility: CLINIC | Age: 53
End: 2023-01-24

## 2023-01-26 ENCOUNTER — APPOINTMENT (OUTPATIENT)
Dept: INTERNAL MEDICINE | Facility: CLINIC | Age: 53
End: 2023-01-26

## 2023-01-30 ENCOUNTER — NON-APPOINTMENT (OUTPATIENT)
Age: 53
End: 2023-01-30

## 2023-01-30 ENCOUNTER — APPOINTMENT (OUTPATIENT)
Dept: FAMILY MEDICINE | Facility: CLINIC | Age: 53
End: 2023-01-30
Payer: MEDICAID

## 2023-01-30 VITALS
OXYGEN SATURATION: 99 % | SYSTOLIC BLOOD PRESSURE: 120 MMHG | TEMPERATURE: 98.2 F | BODY MASS INDEX: 22.5 KG/M2 | HEIGHT: 66 IN | WEIGHT: 140 LBS | DIASTOLIC BLOOD PRESSURE: 80 MMHG | HEART RATE: 74 BPM

## 2023-01-30 PROCEDURE — 99213 OFFICE O/P EST LOW 20 MIN: CPT

## 2023-01-30 NOTE — PLAN
[FreeTextEntry1] : Menopause consultation provided.  Patient was given recommendation to start taking vitamin D and calcium daily.  She was encouraged to start incorporating weight training in her exercise routine.  She is recommended to discuss with her gynecologist for possible hormonal replacement therapy as patient would like to possibly start that.

## 2023-01-30 NOTE — HISTORY OF PRESENT ILLNESS
[FreeTextEntry8] : TOMASA VALLEJO is a 52 year old female presenting for menopause consult. \par \par Patient complains of joint pain in the hands and thinks she is going through menopause. \par Last period was in November 2022.  Patient has history of heavy menses her entire life.  She also gets iron infusions occasionally. \par Recently saw a rheumatologist for full work-up, negative.  Told she has osteoarthritis.

## 2023-03-21 ENCOUNTER — APPOINTMENT (OUTPATIENT)
Dept: RHEUMATOLOGY | Facility: CLINIC | Age: 53
End: 2023-03-21
Payer: MEDICAID

## 2023-03-21 VITALS
TEMPERATURE: 98.1 F | HEIGHT: 66 IN | BODY MASS INDEX: 21.05 KG/M2 | SYSTOLIC BLOOD PRESSURE: 120 MMHG | OXYGEN SATURATION: 100 % | HEART RATE: 70 BPM | DIASTOLIC BLOOD PRESSURE: 70 MMHG | WEIGHT: 131 LBS

## 2023-03-21 PROCEDURE — 99214 OFFICE O/P EST MOD 30 MIN: CPT

## 2023-03-21 RX ORDER — CYCLOBENZAPRINE HYDROCHLORIDE 5 MG/1
5 TABLET, FILM COATED ORAL
Qty: 30 | Refills: 0 | Status: DISCONTINUED | COMMUNITY
Start: 2023-01-03 | End: 2023-03-21

## 2023-03-30 ENCOUNTER — OUTPATIENT (OUTPATIENT)
Dept: OUTPATIENT SERVICES | Facility: HOSPITAL | Age: 53
LOS: 1 days | Discharge: HOME | End: 2023-03-30

## 2023-03-30 DIAGNOSIS — D50.0 IRON DEFICIENCY ANEMIA SECONDARY TO BLOOD LOSS (CHRONIC): ICD-10-CM

## 2023-04-05 ENCOUNTER — APPOINTMENT (OUTPATIENT)
Dept: HEMATOLOGY ONCOLOGY | Facility: CLINIC | Age: 53
End: 2023-04-05

## 2023-04-05 ENCOUNTER — RESULT REVIEW (OUTPATIENT)
Age: 53
End: 2023-04-05

## 2023-04-05 LAB
BASOPHILS # BLD AUTO: 0.02 K/UL — SIGNIFICANT CHANGE UP (ref 0–0.2)
BASOPHILS NFR BLD AUTO: 0.2 % — SIGNIFICANT CHANGE UP (ref 0–2)
EOSINOPHIL # BLD AUTO: 0.04 K/UL — SIGNIFICANT CHANGE UP (ref 0–0.5)
EOSINOPHIL NFR BLD AUTO: 0.4 % — SIGNIFICANT CHANGE UP (ref 0–6)
FERRITIN SERPL-MCNC: 61 NG/ML — SIGNIFICANT CHANGE UP (ref 15–150)
HCT VFR BLD CALC: 36.8 % — SIGNIFICANT CHANGE UP (ref 34.5–45)
HGB BLD-MCNC: 12 G/DL — SIGNIFICANT CHANGE UP (ref 11.5–15.5)
IMM GRANULOCYTES NFR BLD AUTO: 0.2 % — SIGNIFICANT CHANGE UP (ref 0–0.9)
IRON SATN MFR SERPL: 22 UG/DL — LOW (ref 30–160)
IRON SATN MFR SERPL: 8 % — LOW (ref 14–50)
LYMPHOCYTES # BLD AUTO: 0.59 K/UL — LOW (ref 1–3.3)
LYMPHOCYTES # BLD AUTO: 6.5 % — LOW (ref 13–44)
MCHC RBC-ENTMCNC: 29.1 PG — SIGNIFICANT CHANGE UP (ref 27–34)
MCHC RBC-ENTMCNC: 32.6 GM/DL — SIGNIFICANT CHANGE UP (ref 32–36)
MCV RBC AUTO: 89.1 FL — SIGNIFICANT CHANGE UP (ref 80–100)
MONOCYTES # BLD AUTO: 0.64 K/UL — SIGNIFICANT CHANGE UP (ref 0–0.9)
MONOCYTES NFR BLD AUTO: 7 % — SIGNIFICANT CHANGE UP (ref 2–14)
NEUTROPHILS # BLD AUTO: 7.82 K/UL — HIGH (ref 1.8–7.4)
NEUTROPHILS NFR BLD AUTO: 85.7 % — HIGH (ref 43–77)
NRBC # BLD: 0 /100 WBCS — SIGNIFICANT CHANGE UP (ref 0–0)
PLATELET # BLD AUTO: 199 K/UL — SIGNIFICANT CHANGE UP (ref 150–400)
RBC # BLD: 4.13 M/UL — SIGNIFICANT CHANGE UP (ref 3.8–5.2)
RBC # FLD: 12 % — SIGNIFICANT CHANGE UP (ref 10.3–14.5)
TIBC SERPL-MCNC: 279 UG/DL — SIGNIFICANT CHANGE UP (ref 220–430)
UIBC SERPL-MCNC: 257 UG/DL — SIGNIFICANT CHANGE UP (ref 110–370)
WBC # BLD: 9.13 K/UL — SIGNIFICANT CHANGE UP (ref 3.8–10.5)
WBC # FLD AUTO: 9.13 K/UL — SIGNIFICANT CHANGE UP (ref 3.8–10.5)

## 2023-04-12 DIAGNOSIS — M19.042 PRIMARY OSTEOARTHRITIS, RIGHT HAND: ICD-10-CM

## 2023-04-12 DIAGNOSIS — M19.041 PRIMARY OSTEOARTHRITIS, RIGHT HAND: ICD-10-CM

## 2023-04-18 ENCOUNTER — APPOINTMENT (OUTPATIENT)
Dept: NEUROLOGY | Facility: CLINIC | Age: 53
End: 2023-04-18
Payer: MEDICAID

## 2023-04-18 VITALS
DIASTOLIC BLOOD PRESSURE: 82 MMHG | HEART RATE: 75 BPM | TEMPERATURE: 97.8 F | SYSTOLIC BLOOD PRESSURE: 121 MMHG | WEIGHT: 135 LBS | BODY MASS INDEX: 21.69 KG/M2 | HEIGHT: 66 IN

## 2023-04-18 PROCEDURE — 99214 OFFICE O/P EST MOD 30 MIN: CPT

## 2023-04-18 NOTE — HISTORY OF PRESENT ILLNESS
[Chronic Headache] : chronic headache [___ Times Per Month] : [unfilled] times per month [> 72 hours] : > 72 hours [improved] : improved [Stable] : The patient reports ~his/her~ symptoms since the last visit are stable [FreeTextEntry1] : 53-year-old woman history of chronic migraines here for follow-up visit.  Chronic neck and low back pain.  Followed by rheumatology.  History of chronic headaches, reports doing somewhat better, can go several weeks without headache symptoms, but may last 3 to 4 days.  Continues on Maxalt 10 mg as needed taking the medication.\par Does make her feel tired, dry mouth.  No chest pain or palpitations.

## 2023-04-18 NOTE — REVIEW OF SYSTEMS
[As Noted in HPI] : as noted in HPI [Neck Pain] : neck pain [Lower Back Pain] : lower back pain [Negative] : Heme/Lymph

## 2023-04-18 NOTE — ASSESSMENT
[FreeTextEntry1] : 53-year-old woman, right-handed history of chronic headaches, migraines.  Somewhat less headache overall, but they tend to last more, fair response to Maxalt 10 mg, with some minimal side effects.\par Reviewed and discussed treatment options.\par Plan: Prescription and samples for Nurtec 75 mg p.o. daily as needed headache.\par Samples of Ubrelvy 100 mg tablets, take 1 as needed for headache, can repeat within 2 hours.  Maximum dose 20 mg/day.\par Refill Maxalt 10 mg p.o. as needed, can repeat within 2 hours.  Maximum dose of 30 mg/day.\par Return to office, 6 months.

## 2023-04-19 ENCOUNTER — RESULT REVIEW (OUTPATIENT)
Age: 53
End: 2023-04-19

## 2023-04-19 ENCOUNTER — APPOINTMENT (OUTPATIENT)
Dept: INFUSION THERAPY | Facility: CLINIC | Age: 53
End: 2023-04-19

## 2023-04-19 VITALS
RESPIRATION RATE: 18 BRPM | HEART RATE: 70 BPM | OXYGEN SATURATION: 100 % | TEMPERATURE: 97.2 F | DIASTOLIC BLOOD PRESSURE: 86 MMHG | SYSTOLIC BLOOD PRESSURE: 134 MMHG

## 2023-04-19 LAB
BASOPHILS # BLD AUTO: 0.02 K/UL — SIGNIFICANT CHANGE UP (ref 0–0.2)
BASOPHILS NFR BLD AUTO: 0.4 % — SIGNIFICANT CHANGE UP (ref 0–2)
EOSINOPHIL # BLD AUTO: 0.09 K/UL — SIGNIFICANT CHANGE UP (ref 0–0.5)
EOSINOPHIL NFR BLD AUTO: 2 % — SIGNIFICANT CHANGE UP (ref 0–6)
HCT VFR BLD CALC: 33 % — LOW (ref 34.5–45)
HGB BLD-MCNC: 10.7 G/DL — LOW (ref 11.5–15.5)
IMM GRANULOCYTES NFR BLD AUTO: 0.4 % — SIGNIFICANT CHANGE UP (ref 0–0.9)
LYMPHOCYTES # BLD AUTO: 1.03 K/UL — SIGNIFICANT CHANGE UP (ref 1–3.3)
LYMPHOCYTES # BLD AUTO: 22.3 % — SIGNIFICANT CHANGE UP (ref 13–44)
MCHC RBC-ENTMCNC: 29.5 PG — SIGNIFICANT CHANGE UP (ref 27–34)
MCHC RBC-ENTMCNC: 32.4 GM/DL — SIGNIFICANT CHANGE UP (ref 32–36)
MCV RBC AUTO: 90.9 FL — SIGNIFICANT CHANGE UP (ref 80–100)
MONOCYTES # BLD AUTO: 0.27 K/UL — SIGNIFICANT CHANGE UP (ref 0–0.9)
MONOCYTES NFR BLD AUTO: 5.9 % — SIGNIFICANT CHANGE UP (ref 2–14)
NEUTROPHILS # BLD AUTO: 3.18 K/UL — SIGNIFICANT CHANGE UP (ref 1.8–7.4)
NEUTROPHILS NFR BLD AUTO: 69 % — SIGNIFICANT CHANGE UP (ref 43–77)
NRBC # BLD: 0 /100 WBCS — SIGNIFICANT CHANGE UP (ref 0–0)
PLATELET # BLD AUTO: 238 K/UL — SIGNIFICANT CHANGE UP (ref 150–400)
RBC # BLD: 3.63 M/UL — LOW (ref 3.8–5.2)
RBC # FLD: 12.1 % — SIGNIFICANT CHANGE UP (ref 10.3–14.5)
WBC # BLD: 4.61 K/UL — SIGNIFICANT CHANGE UP (ref 3.8–10.5)
WBC # FLD AUTO: 4.61 K/UL — SIGNIFICANT CHANGE UP (ref 3.8–10.5)

## 2023-04-25 ENCOUNTER — RX RENEWAL (OUTPATIENT)
Age: 53
End: 2023-04-25

## 2023-04-27 ENCOUNTER — APPOINTMENT (OUTPATIENT)
Dept: INFUSION THERAPY | Facility: CLINIC | Age: 53
End: 2023-04-27

## 2023-05-01 ENCOUNTER — APPOINTMENT (OUTPATIENT)
Dept: INTERNAL MEDICINE | Facility: CLINIC | Age: 53
End: 2023-05-01
Payer: MEDICAID

## 2023-05-01 VITALS
BODY MASS INDEX: 21.53 KG/M2 | SYSTOLIC BLOOD PRESSURE: 128 MMHG | HEART RATE: 64 BPM | HEIGHT: 66 IN | DIASTOLIC BLOOD PRESSURE: 82 MMHG | TEMPERATURE: 97.6 F | RESPIRATION RATE: 18 BRPM | WEIGHT: 134 LBS | OXYGEN SATURATION: 98 %

## 2023-05-01 DIAGNOSIS — Z82.69 FAMILY HISTORY OF OTHER DISEASES OF THE MUSCULOSKELETAL SYSTEM AND CONNECTIVE TISSUE: ICD-10-CM

## 2023-05-01 DIAGNOSIS — Z86.59 PERSONAL HISTORY OF OTHER MENTAL AND BEHAVIORAL DISORDERS: ICD-10-CM

## 2023-05-01 DIAGNOSIS — G47.9 SLEEP DISORDER, UNSPECIFIED: ICD-10-CM

## 2023-05-01 PROCEDURE — 99214 OFFICE O/P EST MOD 30 MIN: CPT

## 2023-05-01 NOTE — HEALTH RISK ASSESSMENT
[0] : 2) Feeling down, depressed, or hopeless: Not at all (0) [PHQ-2 Negative - No further assessment needed] : PHQ-2 Negative - No further assessment needed [[id number]] : 0

## 2023-05-01 NOTE — PHYSICAL EXAM
[No Acute Distress] : no acute distress [Well Nourished] : well nourished [Well Developed] : well developed [Normal Sclera/Conjunctiva] : normal sclera/conjunctiva [PERRL] : pupils equal round and reactive to light [EOMI] : extraocular movements intact [Normal Outer Ear/Nose] : the outer ears and nose were normal in appearance [Normal Oropharynx] : the oropharynx was normal [Normal Nasal Mucosa] : the nasal mucosa was normal [No Lymphadenopathy] : no lymphadenopathy [Supple] : supple [No Respiratory Distress] : no respiratory distress  [No Accessory Muscle Use] : no accessory muscle use [Clear to Auscultation] : lungs were clear to auscultation bilaterally [Normal Rate] : normal rate  [Regular Rhythm] : with a regular rhythm [Normal S1, S2] : normal S1 and S2 [No Murmur] : no murmur heard [Pedal Pulses Present] : the pedal pulses are present [No Edema] : there was no peripheral edema [No Extremity Clubbing/Cyanosis] : no extremity clubbing/cyanosis [Soft] : abdomen soft [Non Tender] : non-tender [Non-distended] : non-distended [No HSM] : no HSM [No CVA Tenderness] : no CVA  tenderness [No Spinal Tenderness] : no spinal tenderness [No Joint Swelling] : no joint swelling [Grossly Normal Strength/Tone] : grossly normal strength/tone [No Rash] : no rash [Coordination Grossly Intact] : coordination grossly intact [No Focal Deficits] : no focal deficits [Normal Gait] : normal gait [Normal Affect] : the affect was normal [Alert and Oriented x3] : oriented to person, place, and time [Normal Insight/Judgement] : insight and judgment were intact [Normal Supraclavicular Nodes] : no supraclavicular lymphadenopathy [Normal Posterior Cervical Nodes] : no posterior cervical lymphadenopathy [Normal Anterior Cervical Nodes] : no anterior cervical lymphadenopathy [de-identified] : scattered freckles

## 2023-05-01 NOTE — ASSESSMENT
[FreeTextEntry1] : \par 54 yo F pmhx premenstrual syndrome, dysmenorrhea, anemia, chronic headaches, low vit d, hx covid infection 5/22, s/p ER 9/21 with vertigo here for f/u\par \par \par hx covid infection a/w persistent fatigue, dizziness, shortness of breath, hx transient left sided CP.   Reports self resolved.\par -s/p HH ER eval 6/6/22 with negative w/u\par -6/22 cmp/TSH/d-dimer, RVP negative\par -6/22 Hg 11.1 (stable)\par -6/22 cxr: negative\par -6/22 EKG: NSR @ 74 bpm, incomplete RBBB, nl axis, no LVH/path Q (no sig chg c/w 9/21)\par -followed by cardiology, seen 6/22 thought possibly long-haul covid sx's, advised echo (done 6/22) and EST (done 7/22) F/U pending.\par -hx echo 6/22 EF 55-60%, MVP, mild-mod MR, mild TR, nl LV fxn and size\par -seen by pulmonary 7/22 with prior records reviewed, thought sx's likely post-covid infection related and to improve with time. Advised f/u prn.\par -advised increased hydration \par -advised prompt ER if sx's recur or new sx's arise\par \par hx chronic HAs, hx vertigo (resolved, likely BPPV)- improved, hx snoring (told recently by daughter)\par -9/21 CT head: unremarkable\par -followed by neurology, hx negative Sleep study 10/22.  Last seen 4/23 with trial of Nurtec/Ubrelvy given, advised cont. Maxalt prn.  Has f/u 5/23.\par -sleep referral for r/o ROQUE eval given and encouraged\par -hx nl dilated exam by optho 10/21, planned to f/u yearly.  Referral given.\par -advised increased hydration\par -advised prompt medical eval if sx's worsen or new sx's arise\par \par anemia, hx dysmenorrhea, PMS- \par -on Fluoxetine per GYN with good control reported\par -hx oral iron intolerance 2/2 GI sx's\par -4/23 cbc wl, except Hg 9.3 (was 12)\par -followed by hematology, getting IV iron- next pending 5/3/23, last 1 week ago. Planned for labs with heme.\par -followed by GYN, seen 12/21 with negative PAP, states told then no medication indicated.  F/u pending.\par -hx screening colonoscopy 2/22: hemorrhoids, diverticulosis, rec: repeat in 5 yrs (Dr. De Leon)\par -check cbc in 3mo for f/u\par \par depression, hx grief (mom passed away 5/20/22)-  Reports all resolved.\par -declines BH referral\par -reports good social support\par -advised f/u if recurs\par \par hx hand arthritis- asx\par -followed by rheum, told likely OA.  Has f/u 8/23.\par -on meloxicam prn- used infrequently (1x since Rx given by rheum)\par \par hx vit d def-\par -10/22 level wnl\par \par MISC: Continued social distancing and measure for covid19 prevention encouraged. \par -vaccine encouraged\par \par \par HCM\par -hx CPE 10/22, yearly advised\par -10/21 hep C screening negative\par -declines flu shot\par -declines Tdap\par -declines shingles vaccine\par -hx negative PAP/HPV 12/21 by GYN.  Referral given for f/u.\par -hx negative mammo 1/22, Rx given for repeat\par -hx screening colonoscopy 2/22: hemorrhoids, diverticulosis, rec: repeat in 5 yrs (Dr. De Leon)\par -followed by derm, hx FBSE 1/23- yearly advised. Regular use of sun block for skin cancer prevention advised.\par -yearly dental screening advised, referral given per request- pending\par \par

## 2023-05-01 NOTE — PAST MEDICAL HISTORY
[Definite ___ (Date)] : the last menstrual period was [unfilled] [Irregular Cycle Intervals] : are  irregular [Dysmenorrhea] : dysmenorrhea

## 2023-05-01 NOTE — HISTORY OF PRESENT ILLNESS
[FreeTextEntry1] : \par f/u [de-identified] : \par 54 yo F pmhx premenstrual syndrome, dysmenorrhea, anemia, chronic headaches, low vit d, hx covid infection 5/22, s/p ER 9/21 with vertigo here for f/u\par \par Feeling well today.\par Does not need med refill.\par \par Reports is socially distancing and using precautions for covid prevention.\par Denies fever, chills, cough or sob.\par -no hx vaccine, declines\par \par \par c/o snoring x few months, daughter can hear her and she wakes up frequently\par notes sx since since after had negative home sleep study 10/22\par -denies AM HAs, + AM fatigue, +daytime napping\par \par hx covid infection 5/22- hx persistent fatigue limiting activities since covid infection, dizziness, shortness of breath and transient left sided CP.  Reports resolved.\par -is s/p HH ER eval 6/6/22 with negative w/u\par -followed by cardiology, seen 6/22 thought possibly long-haul covid sx's, advised echo (done 6/22) and EST (done 7/22). F/U pending.\par -hx echo 6/22 EF 55-60%, MVP, mild-mod MR, mild TR, nl LV fxn and size\par -seen by pulmonary 7/22 with prior records reviewed, thought sx's likely post-covid infection related and to improve with time. Advised f/u prn.\par -denies dizziness, fatigue, CP or sob.\par -exercise: no formal, stays active\par \par hx grief, depression- reports all resolved.  \par -hx  referral, now declines- feels is not needed\par -denies HI/SI or anxiety\par \par hx chronic headaches- notes overall less frequent but can be in clusters\par -on Maxalt prn- taking 1-2x/month\par -HA: throbbing, frontal, +light/noise sensitivity; no vision loss or dizziness, dysuria or focal weakness\par -resolves with sleep or Excedrin use\par -triggers: dehydration\par -followed by neurology, hx negative Sleep study 10/22.  Last seen 4/23 with trial of Nurtec/Ubrelvy given, advised cont. Maxalt prn.  Has f/u 5/23.  States tried Ubrelvy- did not seem to work quickly\par -reports nl dilated exam by optho 10/21, planned to f/u yearly - needs new MD\par \par hx anemia, heavy menses- states menses irreg since 11/22- last 3/23, still heavy.  Thinks going through early menopause.\par -followed by hematology, getting IV iron- next pending 5/3/23, last 1 week ago. Planned for labs with heme.\par -followed by GYN, seen 12/21 with negative PAP, states told then no medication indicated.  F/u pending.\par -hx screening colonoscopy 2/22: hemorrhoids, diverticulosis, rec: repeat in 5 yrs (Dr. De Leon)\par \par hx PMS- reports controlled\par -on fluoxetine by GYN, taking x 10 yrs- denies SEs.  \par \par Reports hx hand arthritis on/off\par -followed by rheum, told likely OA.  Has f/u 8/23.\par -on meloxicam prn- used infrequently (1x since Rx given by rheum)\par \par Denies urinary complaints.

## 2023-05-01 NOTE — REVIEW OF SYSTEMS
[Fatigue] : fatigue [Negative] : Integumentary [Fever] : no fever [Chills] : no chills [FreeTextEntry8] : see HPI [FreeTextEntry9] : see HPI [de-identified] : see HPI [de-identified] : see HPI

## 2023-05-03 ENCOUNTER — APPOINTMENT (OUTPATIENT)
Dept: INFUSION THERAPY | Facility: CLINIC | Age: 53
End: 2023-05-03

## 2023-05-03 VITALS
SYSTOLIC BLOOD PRESSURE: 122 MMHG | RESPIRATION RATE: 18 BRPM | HEART RATE: 83 BPM | DIASTOLIC BLOOD PRESSURE: 76 MMHG | OXYGEN SATURATION: 99 % | TEMPERATURE: 97.3 F

## 2023-05-04 DIAGNOSIS — N92.0 EXCESSIVE AND FREQUENT MENSTRUATION WITH REGULAR CYCLE: ICD-10-CM

## 2023-05-14 ENCOUNTER — RX RENEWAL (OUTPATIENT)
Age: 53
End: 2023-05-14

## 2023-05-22 ENCOUNTER — APPOINTMENT (OUTPATIENT)
Dept: INTERNAL MEDICINE | Facility: CLINIC | Age: 53
End: 2023-05-22
Payer: MEDICAID

## 2023-05-22 VITALS
WEIGHT: 132 LBS | HEIGHT: 65.1 IN | OXYGEN SATURATION: 96 % | HEART RATE: 85 BPM | DIASTOLIC BLOOD PRESSURE: 60 MMHG | TEMPERATURE: 98.5 F | BODY MASS INDEX: 21.99 KG/M2 | RESPIRATION RATE: 16 BRPM | SYSTOLIC BLOOD PRESSURE: 110 MMHG

## 2023-05-22 DIAGNOSIS — G47.19 OTHER HYPERSOMNIA: ICD-10-CM

## 2023-05-22 DIAGNOSIS — G47.33 OBSTRUCTIVE SLEEP APNEA (ADULT) (PEDIATRIC): ICD-10-CM

## 2023-05-22 DIAGNOSIS — Z87.898 PERSONAL HISTORY OF OTHER SPECIFIED CONDITIONS: ICD-10-CM

## 2023-05-22 PROCEDURE — 99214 OFFICE O/P EST MOD 30 MIN: CPT

## 2023-05-22 NOTE — ASSESSMENT
[FreeTextEntry1] : #1  53-year-old female with a history of significant snoring, excessive daytime somnolence, having to take naps, and awakening in the mornings not feeling refreshed.  There have been witnessed apneas.  Evaluate for obstructive sleep apnea.  She did have a normal home sleep study on October 19, 2022 with an AHI of 2.3 events per hour.  The patient tells me she did not sleep during that study.  I am ordering for her to have an overnight polysomnography study in the lab.  I will review the results with her once available.  TOMASA will sleep on the sides and avoid alcohol and sedating medicines. The patient knows fully not to drive or work with machinery if having any sleepiness or tiredness.

## 2023-05-22 NOTE — HISTORY OF PRESENT ILLNESS
[TextBox_4] : This is a return visit for this 53-year-old female.  She had COVID infection in May 2022 and afterwards noted variable tiredness, dizziness, shortness of breath, atypical chest pains.  The symptoms have resolved.\par \par The patient lives with her daughter and has a history of heavy snoring.  She says she is excessively sleepy during her daytimes and will take naps.  She does not awaken in general feeling refreshed.  There have been witnessed apneas.  Home sleep study on October 19, 2022 was within normal limits with an AHI of 2.3 events per hour.  If there is strong clinical suspicion of obstructive sleep apnea an attended polysomnography study can be considered.\par \par The patient never smoked cigarettes.  She does not drink alcohol.\par \par She denies recent coughing, wheezing, dyspnea on exertion, palpitations, or syncope or fever. \par

## 2023-05-23 ENCOUNTER — APPOINTMENT (OUTPATIENT)
Dept: NEUROLOGY | Facility: CLINIC | Age: 53
End: 2023-05-23

## 2023-05-25 ENCOUNTER — APPOINTMENT (OUTPATIENT)
Dept: INTERNAL MEDICINE | Facility: CLINIC | Age: 53
End: 2023-05-25

## 2023-06-22 ENCOUNTER — OUTPATIENT (OUTPATIENT)
Dept: OUTPATIENT SERVICES | Facility: HOSPITAL | Age: 53
LOS: 1 days | Discharge: HOME | End: 2023-06-22

## 2023-06-22 DIAGNOSIS — D50.0 IRON DEFICIENCY ANEMIA SECONDARY TO BLOOD LOSS (CHRONIC): ICD-10-CM

## 2023-06-23 ENCOUNTER — RESULT REVIEW (OUTPATIENT)
Age: 53
End: 2023-06-23

## 2023-06-23 ENCOUNTER — APPOINTMENT (OUTPATIENT)
Dept: HEMATOLOGY ONCOLOGY | Facility: CLINIC | Age: 53
End: 2023-06-23

## 2023-06-23 LAB
BASOPHILS # BLD AUTO: 0.02 K/UL — SIGNIFICANT CHANGE UP (ref 0–0.2)
BASOPHILS NFR BLD AUTO: 0.5 % — SIGNIFICANT CHANGE UP (ref 0–2)
EOSINOPHIL # BLD AUTO: 0.08 K/UL — SIGNIFICANT CHANGE UP (ref 0–0.5)
EOSINOPHIL NFR BLD AUTO: 2 % — SIGNIFICANT CHANGE UP (ref 0–6)
HCT VFR BLD CALC: 34.4 % — LOW (ref 34.5–45)
HGB BLD-MCNC: 11.1 G/DL — LOW (ref 11.5–15.5)
IMM GRANULOCYTES NFR BLD AUTO: 0.3 % — SIGNIFICANT CHANGE UP (ref 0–0.9)
LYMPHOCYTES # BLD AUTO: 1.04 K/UL — SIGNIFICANT CHANGE UP (ref 1–3.3)
LYMPHOCYTES # BLD AUTO: 26.4 % — SIGNIFICANT CHANGE UP (ref 13–44)
MCHC RBC-ENTMCNC: 29.5 PG — SIGNIFICANT CHANGE UP (ref 27–34)
MCHC RBC-ENTMCNC: 32.3 GM/DL — SIGNIFICANT CHANGE UP (ref 32–36)
MCV RBC AUTO: 91.5 FL — SIGNIFICANT CHANGE UP (ref 80–100)
MONOCYTES # BLD AUTO: 0.25 K/UL — SIGNIFICANT CHANGE UP (ref 0–0.9)
MONOCYTES NFR BLD AUTO: 6.3 % — SIGNIFICANT CHANGE UP (ref 2–14)
NEUTROPHILS # BLD AUTO: 2.54 K/UL — SIGNIFICANT CHANGE UP (ref 1.8–7.4)
NEUTROPHILS NFR BLD AUTO: 64.5 % — SIGNIFICANT CHANGE UP (ref 43–77)
NRBC # BLD: 0 /100 WBCS — SIGNIFICANT CHANGE UP (ref 0–0)
PLATELET # BLD AUTO: 201 K/UL — SIGNIFICANT CHANGE UP (ref 150–400)
RBC # BLD: 3.76 M/UL — LOW (ref 3.8–5.2)
RBC # FLD: 13 % — SIGNIFICANT CHANGE UP (ref 10.3–14.5)
WBC # BLD: 3.94 K/UL — SIGNIFICANT CHANGE UP (ref 3.8–10.5)
WBC # FLD AUTO: 3.94 K/UL — SIGNIFICANT CHANGE UP (ref 3.8–10.5)

## 2023-06-24 LAB
FERRITIN SERPL-MCNC: 320 NG/ML — HIGH (ref 15–150)
IRON SATN MFR SERPL: 38 % — SIGNIFICANT CHANGE UP (ref 14–50)
IRON SATN MFR SERPL: 80 UG/DL — SIGNIFICANT CHANGE UP (ref 30–160)
TIBC SERPL-MCNC: 209 UG/DL — LOW (ref 220–430)
UIBC SERPL-MCNC: 129 UG/DL — SIGNIFICANT CHANGE UP (ref 110–370)

## 2023-07-24 ENCOUNTER — RESULT REVIEW (OUTPATIENT)
Age: 53
End: 2023-07-24

## 2023-07-24 ENCOUNTER — APPOINTMENT (OUTPATIENT)
Dept: HEMATOLOGY ONCOLOGY | Facility: CLINIC | Age: 53
End: 2023-07-24

## 2023-07-24 LAB
BASOPHILS # BLD AUTO: 0.02 K/UL — SIGNIFICANT CHANGE UP (ref 0–0.2)
BASOPHILS NFR BLD AUTO: 0.4 % — SIGNIFICANT CHANGE UP (ref 0–2)
EOSINOPHIL # BLD AUTO: 0.17 K/UL — SIGNIFICANT CHANGE UP (ref 0–0.5)
EOSINOPHIL NFR BLD AUTO: 3.7 % — SIGNIFICANT CHANGE UP (ref 0–6)
HCT VFR BLD CALC: 33.2 % — LOW (ref 34.5–45)
HGB BLD-MCNC: 10.6 G/DL — LOW (ref 11.5–15.5)
IMM GRANULOCYTES NFR BLD AUTO: 0.4 % — SIGNIFICANT CHANGE UP (ref 0–0.9)
LYMPHOCYTES # BLD AUTO: 1.06 K/UL — SIGNIFICANT CHANGE UP (ref 1–3.3)
LYMPHOCYTES # BLD AUTO: 22.8 % — SIGNIFICANT CHANGE UP (ref 13–44)
MCHC RBC-ENTMCNC: 29.5 PG — SIGNIFICANT CHANGE UP (ref 27–34)
MCHC RBC-ENTMCNC: 31.9 GM/DL — LOW (ref 32–36)
MCV RBC AUTO: 92.5 FL — SIGNIFICANT CHANGE UP (ref 80–100)
MONOCYTES # BLD AUTO: 0.38 K/UL — SIGNIFICANT CHANGE UP (ref 0–0.9)
MONOCYTES NFR BLD AUTO: 8.2 % — SIGNIFICANT CHANGE UP (ref 2–14)
NEUTROPHILS # BLD AUTO: 3 K/UL — SIGNIFICANT CHANGE UP (ref 1.8–7.4)
NEUTROPHILS NFR BLD AUTO: 64.5 % — SIGNIFICANT CHANGE UP (ref 43–77)
NRBC # BLD: 0 /100 WBCS — SIGNIFICANT CHANGE UP (ref 0–0)
PLATELET # BLD AUTO: 224 K/UL — SIGNIFICANT CHANGE UP (ref 150–400)
RBC # BLD: 3.59 M/UL — LOW (ref 3.8–5.2)
RBC # FLD: 12.6 % — SIGNIFICANT CHANGE UP (ref 10.3–14.5)
WBC # BLD: 4.65 K/UL — SIGNIFICANT CHANGE UP (ref 3.8–10.5)
WBC # FLD AUTO: 4.65 K/UL — SIGNIFICANT CHANGE UP (ref 3.8–10.5)

## 2023-07-25 LAB
FERRITIN SERPL-MCNC: 181 NG/ML — SIGNIFICANT CHANGE UP (ref 13–330)
IRON SATN MFR SERPL: 36 % — SIGNIFICANT CHANGE UP (ref 14–50)
IRON SATN MFR SERPL: 87 UG/DL — SIGNIFICANT CHANGE UP (ref 30–160)
TIBC SERPL-MCNC: 243 UG/DL — SIGNIFICANT CHANGE UP (ref 220–430)
UIBC SERPL-MCNC: 156 UG/DL — SIGNIFICANT CHANGE UP (ref 110–370)

## 2023-07-27 ENCOUNTER — APPOINTMENT (OUTPATIENT)
Dept: OBGYN | Facility: CLINIC | Age: 53
End: 2023-07-27
Payer: MEDICAID

## 2023-07-27 VITALS
SYSTOLIC BLOOD PRESSURE: 112 MMHG | WEIGHT: 135 LBS | DIASTOLIC BLOOD PRESSURE: 62 MMHG | BODY MASS INDEX: 22.49 KG/M2 | HEIGHT: 65 IN

## 2023-07-27 DIAGNOSIS — Z12.39 ENCOUNTER FOR OTHER SCREENING FOR MALIGNANT NEOPLASM OF BREAST: ICD-10-CM

## 2023-07-27 DIAGNOSIS — Z01.419 ENCOUNTER FOR GYNECOLOGICAL EXAMINATION (GENERAL) (ROUTINE) W/OUT ABNORMAL FINDINGS: ICD-10-CM

## 2023-07-27 PROCEDURE — 99396 PREV VISIT EST AGE 40-64: CPT

## 2023-08-01 LAB
CYTOLOGY CVX/VAG DOC THIN PREP: NORMAL
HPV HIGH+LOW RISK DNA PNL CVX: NOT DETECTED

## 2023-08-02 ENCOUNTER — RESULT REVIEW (OUTPATIENT)
Age: 53
End: 2023-08-02

## 2023-08-02 ENCOUNTER — APPOINTMENT (OUTPATIENT)
Dept: HEMATOLOGY ONCOLOGY | Facility: CLINIC | Age: 53
End: 2023-08-02
Payer: MEDICAID

## 2023-08-02 ENCOUNTER — NON-APPOINTMENT (OUTPATIENT)
Age: 53
End: 2023-08-02

## 2023-08-02 VITALS
RESPIRATION RATE: 16 BRPM | DIASTOLIC BLOOD PRESSURE: 92 MMHG | HEIGHT: 65 IN | HEART RATE: 73 BPM | TEMPERATURE: 98.3 F | BODY MASS INDEX: 22.49 KG/M2 | OXYGEN SATURATION: 99 % | WEIGHT: 135 LBS | SYSTOLIC BLOOD PRESSURE: 148 MMHG

## 2023-08-02 DIAGNOSIS — R53.83 OTHER FATIGUE: ICD-10-CM

## 2023-08-02 LAB
BASOPHILS # BLD AUTO: 0.02 K/UL — SIGNIFICANT CHANGE UP (ref 0–0.2)
BASOPHILS NFR BLD AUTO: 0.5 % — SIGNIFICANT CHANGE UP (ref 0–2)
EOSINOPHIL # BLD AUTO: 0.11 K/UL — SIGNIFICANT CHANGE UP (ref 0–0.5)
EOSINOPHIL NFR BLD AUTO: 2.6 % — SIGNIFICANT CHANGE UP (ref 0–6)
HCT VFR BLD CALC: 39.6 % — SIGNIFICANT CHANGE UP (ref 34.5–45)
HGB BLD-MCNC: 12.8 G/DL — SIGNIFICANT CHANGE UP (ref 11.5–15.5)
IMM GRANULOCYTES NFR BLD AUTO: 0.7 % — SIGNIFICANT CHANGE UP (ref 0–0.9)
LYMPHOCYTES # BLD AUTO: 0.58 K/UL — LOW (ref 1–3.3)
LYMPHOCYTES # BLD AUTO: 13.6 % — SIGNIFICANT CHANGE UP (ref 13–44)
MCHC RBC-ENTMCNC: 29.6 PG — SIGNIFICANT CHANGE UP (ref 27–34)
MCHC RBC-ENTMCNC: 32.3 GM/DL — SIGNIFICANT CHANGE UP (ref 32–36)
MCV RBC AUTO: 91.5 FL — SIGNIFICANT CHANGE UP (ref 80–100)
MONOCYTES # BLD AUTO: 0.5 K/UL — SIGNIFICANT CHANGE UP (ref 0–0.9)
MONOCYTES NFR BLD AUTO: 11.7 % — SIGNIFICANT CHANGE UP (ref 2–14)
NEUTROPHILS # BLD AUTO: 3.04 K/UL — SIGNIFICANT CHANGE UP (ref 1.8–7.4)
NEUTROPHILS NFR BLD AUTO: 70.9 % — SIGNIFICANT CHANGE UP (ref 43–77)
NRBC # BLD: 0 /100 WBCS — SIGNIFICANT CHANGE UP (ref 0–0)
PLATELET # BLD AUTO: 218 K/UL — SIGNIFICANT CHANGE UP (ref 150–400)
RBC # BLD: 4.33 M/UL — SIGNIFICANT CHANGE UP (ref 3.8–5.2)
RBC # FLD: 12.5 % — SIGNIFICANT CHANGE UP (ref 10.3–14.5)
WBC # BLD: 4.28 K/UL — SIGNIFICANT CHANGE UP (ref 3.8–10.5)
WBC # FLD AUTO: 4.28 K/UL — SIGNIFICANT CHANGE UP (ref 3.8–10.5)

## 2023-08-02 PROCEDURE — 99213 OFFICE O/P EST LOW 20 MIN: CPT

## 2023-08-03 LAB
ALBUMIN SERPL ELPH-MCNC: 4.6 G/DL — SIGNIFICANT CHANGE UP (ref 3.3–5)
ALP SERPL-CCNC: 87 U/L — SIGNIFICANT CHANGE UP (ref 40–120)
ALT FLD-CCNC: 29 U/L — SIGNIFICANT CHANGE UP (ref 10–45)
ANION GAP SERPL CALC-SCNC: 14 MMOL/L — SIGNIFICANT CHANGE UP (ref 5–17)
AST SERPL-CCNC: 28 U/L — SIGNIFICANT CHANGE UP (ref 10–40)
BILIRUB SERPL-MCNC: 0.2 MG/DL — SIGNIFICANT CHANGE UP (ref 0.2–1.2)
BUN SERPL-MCNC: 12 MG/DL — SIGNIFICANT CHANGE UP (ref 7–23)
CALCIUM SERPL-MCNC: 9.7 MG/DL — SIGNIFICANT CHANGE UP (ref 8.4–10.5)
CHLORIDE SERPL-SCNC: 104 MMOL/L — SIGNIFICANT CHANGE UP (ref 96–108)
CO2 SERPL-SCNC: 22 MMOL/L — SIGNIFICANT CHANGE UP (ref 22–31)
CREAT SERPL-MCNC: 0.87 MG/DL — SIGNIFICANT CHANGE UP (ref 0.5–1.3)
EGFR: 80 ML/MIN/1.73M2 — SIGNIFICANT CHANGE UP
ERYTHROCYTE [SEDIMENTATION RATE] IN BLOOD: 13 MM/HR — SIGNIFICANT CHANGE UP (ref 0–20)
FERRITIN SERPL-MCNC: 173 NG/ML — SIGNIFICANT CHANGE UP (ref 13–330)
FOLATE SERPL-MCNC: 18.2 NG/ML — SIGNIFICANT CHANGE UP
GLUCOSE SERPL-MCNC: 92 MG/DL — SIGNIFICANT CHANGE UP (ref 70–99)
HAPTOGLOB SERPL-MCNC: 119 MG/DL — SIGNIFICANT CHANGE UP (ref 34–200)
IRON SATN MFR SERPL: 28 % — SIGNIFICANT CHANGE UP (ref 14–50)
IRON SATN MFR SERPL: 30 UG/DL — SIGNIFICANT CHANGE UP (ref 30–160)
MAGNESIUM SERPL-MCNC: 2.1 MG/DL — SIGNIFICANT CHANGE UP (ref 1.6–2.6)
POTASSIUM SERPL-MCNC: 4.1 MMOL/L — SIGNIFICANT CHANGE UP (ref 3.5–5.3)
POTASSIUM SERPL-SCNC: 4.1 MMOL/L — SIGNIFICANT CHANGE UP (ref 3.5–5.3)
PROT SERPL-MCNC: 6.9 G/DL — SIGNIFICANT CHANGE UP (ref 6–8.3)
RBC # BLD: 4.34 M/UL — SIGNIFICANT CHANGE UP (ref 3.8–5.2)
RETICS #: 100.7 K/UL — SIGNIFICANT CHANGE UP (ref 25–125)
RETICS/RBC NFR: 2.3 % — SIGNIFICANT CHANGE UP (ref 0.5–2.5)
SODIUM SERPL-SCNC: 140 MMOL/L — SIGNIFICANT CHANGE UP (ref 135–145)
TIBC SERPL-MCNC: 105 UG/DL — LOW (ref 220–430)
UIBC SERPL-MCNC: 76 UG/DL — LOW (ref 110–370)
VIT B12 SERPL-MCNC: 780 PG/ML — SIGNIFICANT CHANGE UP (ref 232–1245)

## 2023-08-07 ENCOUNTER — APPOINTMENT (OUTPATIENT)
Dept: INTERNAL MEDICINE | Facility: CLINIC | Age: 53
End: 2023-08-07
Payer: MEDICAID

## 2023-08-07 VITALS
SYSTOLIC BLOOD PRESSURE: 120 MMHG | OXYGEN SATURATION: 99 % | WEIGHT: 136 LBS | BODY MASS INDEX: 22.66 KG/M2 | DIASTOLIC BLOOD PRESSURE: 68 MMHG | RESPIRATION RATE: 16 BRPM | HEART RATE: 72 BPM | TEMPERATURE: 98.2 F | HEIGHT: 65 IN

## 2023-08-07 DIAGNOSIS — R06.83 SNORING: ICD-10-CM

## 2023-08-07 DIAGNOSIS — N92.0 EXCESSIVE AND FREQUENT MENSTRUATION WITH REGULAR CYCLE: ICD-10-CM

## 2023-08-07 PROCEDURE — 99213 OFFICE O/P EST LOW 20 MIN: CPT

## 2023-08-07 RX ORDER — RIMEGEPANT SULFATE 75 MG/75MG
TABLET, ORALLY DISINTEGRATING ORAL DAILY
Qty: 8 | Refills: 5 | Status: DISCONTINUED | COMMUNITY
Start: 2023-04-18 | End: 2023-08-07

## 2023-08-07 NOTE — REVIEW OF SYSTEMS
[Fatigue] : fatigue [Negative] : Integumentary [Fever] : no fever [Chills] : no chills [FreeTextEntry8] : see HPI [FreeTextEntry9] : see HPI [de-identified] : see HPI [de-identified] : see HPI

## 2023-08-07 NOTE — PHYSICAL EXAM
[No Acute Distress] : no acute distress [Well Nourished] : well nourished [Well Developed] : well developed [Normal Sclera/Conjunctiva] : normal sclera/conjunctiva [PERRL] : pupils equal round and reactive to light [EOMI] : extraocular movements intact [Normal Outer Ear/Nose] : the outer ears and nose were normal in appearance [Normal Oropharynx] : the oropharynx was normal [Normal Nasal Mucosa] : the nasal mucosa was normal [No Lymphadenopathy] : no lymphadenopathy [Supple] : supple [No Respiratory Distress] : no respiratory distress  [No Accessory Muscle Use] : no accessory muscle use [Clear to Auscultation] : lungs were clear to auscultation bilaterally [Normal Rate] : normal rate  [Regular Rhythm] : with a regular rhythm [Normal S1, S2] : normal S1 and S2 [No Murmur] : no murmur heard [Pedal Pulses Present] : the pedal pulses are present [No Edema] : there was no peripheral edema [No Extremity Clubbing/Cyanosis] : no extremity clubbing/cyanosis [Soft] : abdomen soft [Non Tender] : non-tender [Non-distended] : non-distended [No HSM] : no HSM [Normal Supraclavicular Nodes] : no supraclavicular lymphadenopathy [Normal Posterior Cervical Nodes] : no posterior cervical lymphadenopathy [Normal Anterior Cervical Nodes] : no anterior cervical lymphadenopathy [No CVA Tenderness] : no CVA  tenderness [No Spinal Tenderness] : no spinal tenderness [No Joint Swelling] : no joint swelling [Grossly Normal Strength/Tone] : grossly normal strength/tone [No Rash] : no rash [Coordination Grossly Intact] : coordination grossly intact [No Focal Deficits] : no focal deficits [Normal Gait] : normal gait [Normal Affect] : the affect was normal [Alert and Oriented x3] : oriented to person, place, and time [Normal Insight/Judgement] : insight and judgment were intact [de-identified] : scattered freckles

## 2023-08-07 NOTE — HISTORY OF PRESENT ILLNESS
[FreeTextEntry1] : f/u [de-identified] : 52 yo F pmhx premenstrual syndrome, dysmenorrhea, anemia, chronic headaches, low vit d, hx covid infection 5/22, s/p ER 9/21 with vertigo here for f/u  Feeling well today. Needs med refill.  Denies fever, chills, cough or sob. -no hx vaccine, declines  hx snoring x few months, daughter can hear her and she wakes up frequently notes sx since since after had negative home sleep study 10/22 -denies AM HAs, + AM fatigue, +daytime napping -seen by pulm/sleep specialist 5/23, awaiting in lab sleep study 8/23  hx covid infection 5/22- hx persistent fatigue limiting activities since covid infection, dizziness, shortness of breath and transient left sided CP.  Reports resolved. -is s/p HH ER eval 6/6/22 with negative w/u -followed by cardiology, seen 6/22 thought possibly long-haul covid sx's, advised echo (done 6/22) and EST (done 7/22). F/U pending. -hx echo 6/22 EF 55-60%, MVP, mild-mod MR, mild TR, nl LV fxn and size -seen by pulmonary 7/22 with prior records reviewed, thought sx's likely post-covid infection related and to improve with time.  -denies dizziness, fatigue, CP or sob. -exercise: no formal, stays active  hx grief, depression- reports all resolved.   -hx  referral, now declines- feels is not needed -denies HI/SI or anxiety  hx chronic headaches- notes overall less frequent but can be in clusters -on Maxalt prn- taking 1-2x/month -HA: throbbing, frontal, +light/noise sensitivity; no vision loss or dizziness, dysuria or focal weakness -resolves with sleep or Excedrin use -triggers: dehydration -followed by neurology, hx negative Sleep study 10/22.  Last seen 4/23 with trial of Nurtec/Ubrelvy given, advised cont. Maxalt prn.  Has f/u 5/23.  States tried Ubrelvy- did not seem to work quickly -reports nl dilated exam by optho 10/21, planned to f/u yearly - pending  hx anemia, heavy menses- states menses irreg since 11/22- last 3/23, still heavy.   -followed by hematology, hx getting IV iron- last 5/23.  Had labs by heme 8/23- found nl. -followed by GYN, seen 7/23 with negative PAP -hx screening colonoscopy 2/22: hemorrhoids, diverticulosis, rec: repeat in 5 yrs (Dr. De Leon)  hx PMS- reports controlled -on fluoxetine by GYN, taking x 10 yrs- denies SEs.    Reports hx hand arthritis on/off -followed by rheum, told likely OA.  Has f/u 8/23. -on meloxicam prn- used infrequently (1x since Rx given by rheum).  On Aleve prn with help. -heat and stretching helps  Denies urinary complaints.

## 2023-08-07 NOTE — ASSESSMENT
[FreeTextEntry1] : 54 yo F pmhx premenstrual syndrome, dysmenorrhea, anemia, chronic headaches, low vit d, hx covid infection 5/22, s/p ER 9/21 with vertigo here for f/u   hx covid infection a/w persistent fatigue, dizziness, shortness of breath, hx transient left sided CP.   Resolved. -s/p HH ER eval 6/6/22 with negative w/u -6/22 cmp/TSH/d-dimer, RVP negative -6/22 Hg 11.1 (stable) -6/22 cxr: negative -followed by cardiology, seen 6/22 thought possibly long-haul covid sx's, advised echo (done 6/22) and EST (done 7/22) F/U pending. -hx echo 6/22 EF 55-60%, MVP, mild-mod MR, mild TR, nl LV fxn and size -seen by pulmonary 7/22 with prior records reviewed, thought sx's likely post-covid infection related and to improve with time. Advised f/u prn. -advised increased hydration  -advised prompt ER if sx's recur or new sx's arise  hx chronic HAs, hx vertigo (resolved, likely BPPV)- improved, hx snoring (told recently by daughter) -9/21 CT head: unremarkable -followed by neurology, hx negative Sleep study 10/22.  Last seen 4/23 with trial of Nurtec/Ubrelvy given, advised cont. Maxalt prn.  Has f/u 5/23. -seen by pulm/sleep specialist 5/23, awaiting in lab sleep study 8/23 - encouraged -hx nl dilated exam by optho 10/21, planned to f/u yearly.  Referral given- pending. -advised increased hydration -advised prompt medical eval if sx's worsen or new sx's arise  anemia, hx dysmenorrhea, PMS-  -on Fluoxetine per GYN with good control reported -hx oral iron intolerance 2/2 GI sx's -4/23 cbc wl, except Hg 9.3 (was 12) -8/23 cbc wnl, ferritin 173 -followed by hematology, hx getting IV iron- last 5/23. Had labs by heme 8/23- found nl. -followed by GYN, seen 7/23 with negative PAP -hx screening colonoscopy 2/22: hemorrhoids, diverticulosis, rec: repeat in 5 yrs (Dr. De Leon)  depression, hx grief (mom passed away 5/20/22)-  Reports all resolved. -declines  referral -reports good social support -advised f/u if recurs  hx hand arthritis- asx -followed by rheum, told likely OA.  Has f/u 8/23. -on meloxicam prn- used infrequently (1x since Rx given by rheum).  On Aleve prn- R/B/SE discussed, advised to avoid concurrent use with Meloxicam.  hx vit d def- -10/22 level wnl  MISC: Continued social distancing and measure for covid19 prevention encouraged.  -vaccine encouraged   HCM -hx CPE 10/22, yearly advised -10/21 hep C screening negative -declines flu shot -declines Tdap -declines shingles vaccine -hx negative PAP/HPV 7/23 by GYN. -hx negative mammo 1/22, has Rx given for repeat by GYN -hx screening colonoscopy 2/22: hemorrhoids, diverticulosis, rec: repeat in 5 yrs (Dr. De Leon) -followed by derm, hx FBSE 1/23- yearly advised. Regular use of sun block for skin cancer prevention advised. -yearly dental screening advised, referral given per request- pending

## 2023-08-12 NOTE — ED PROVIDER NOTE - NEUROLOGICAL, MLM
[facility] Alert Outreach Telephone Call Attempt     Patient is being outreached by the [facility]itions Program for a clinical alert from the [facility] monitoring program.     Call Attempt Date: 8/12/2023    Call Attempt: First Attempt   Alert and oriented, no focal deficits, no motor or sensory deficits.

## 2023-08-16 PROBLEM — R53.83 FATIGUE, UNSPECIFIED TYPE: Status: ACTIVE | Noted: 2022-06-06

## 2023-08-16 NOTE — PHYSICAL EXAM
[Normal] : affect appropriate [de-identified] : anicteric [de-identified] : no c/c/e [de-identified] : no rashes

## 2023-08-16 NOTE — REVIEW OF SYSTEMS
[Patient Intake Form Reviewed] : Patient intake form was reviewed [Fatigue] : fatigue [Negative] : Allergic/Immunologic [de-identified] : brain fog [Joint Pain] : joint pain [Joint Stiffness] : joint stiffness [FreeTextEntry4] : Recent dental abscess [FreeTextEntry9] : RLS, OA hands

## 2023-08-16 NOTE — HISTORY OF PRESENT ILLNESS
[de-identified] : TOMASA VALLEJO is a 52 y.o. F with a PMH significant for chronic headaches, who we are following for recurrent MILKA.   10/21/21 - Hgb: 10.9,  MCV: 91.1,  B12: 730,  Folate: 7.0,  Ferritin: 19, TSAT: 68% 21 - Initially seen in our office.  Reported history of chronic anemia for many years.  Her Hgb was down to 7 in the  past. She has been taking Ferrous sulfate 65 mg of elemental iron daily for at least three years (takes on empty stomach).  Did not tolerate twice daily iron - constipation.  Menses heavy for years. Has two children. Chronic fatigue.  No GI c/o. No family hx of anemia.    Etiology of her iron deficiency was attributed to ongoing menstrual blood losses. Given IV Feraheme x 2 - Last 1 month after with Hgb: 12.0, Ferritin 244   22 - Colonoscopy (Jean De Leon) - Large hemorrhoids, non-bleeding diverticula.   22 - Hgb: 12.0, but Ferritin down to 18.  Was advised to come for Feraheme x 2 but only came for 1 infusion, no f/u after.  Patient reports didn't come for 2nd IV iron infusion in spring as her mom got sick, then .  She then had COVID.   10/24/22 - Hgb: 9.3,  Hct: 30.4,  MCV: 80.0,  Plts: 376, Ferritin: 4, TSAT: 4, B12: 443, Folate: 15.2 Feraheme x 2  23 - Hgb: 12.0, MCV: 91.5, Ferritin: 61, TSAT: 8  Feraheme x 3 [de-identified] : 7/24/23 - WBC: 4.65, Hgb: 10.6,  Hct: 33.2,  MCV: 92.5,  Plts: 224, Ferritin: 181, TSAT: 36% Patient was asked to come in to discuss as Hgb worse despite repleting iron stores.  Patient reports has been having increased fatigue.   Did have recent dental abscess.   Has OA hands and carpal tunnel.  Had severe RLS when iron deficient - this is not acting up now.  Periods still heavy.  Does not recall having any recent illness, viral syndrome.  Denies any other changes in her family, medical, or social history since her last visit of 11/1/22.

## 2023-08-16 NOTE — ASSESSMENT
[FreeTextEntry1] : Patient is a 52 y.o.F with a recurrent iron deficiency anemia.     GI eval 2/18/22 - routine colonoscopy negative for bleeding. Hgb electrophoresis was normal.  Iron deficiency attributed to ongoing menstrual blood losses.  4/5/23 - Hgb: 12.0, MCV: 91.5, Ferritin: 61, TSAT: 8  Feraheme x 3 7/24/23 - WBC: 4.65, Hgb: 10.6,  Hct: 33.2,  MCV: 92.5,  Plts: 224, Ferritin: 181, TSAT: 36% Patient was asked to come in to discuss as Hgb worse despite repleting iron stores.  Today Hgb normal at 12.8.  Suspect anemia in July was from a viral process that has resolved.   Await rest of pending labs.  Will continue to monitor.     PCP ZENOBIA SHAIKH

## 2023-08-16 NOTE — RESULTS/DATA
[FreeTextEntry1] : 10/24/22 -  WBC: 4.28,  Hgb: 12.8,  Hct: 39.6,  MCV:91.5,  Plts: 218 CMP, Ferritin, TSAt, ESR, B12, Folate, Hapto, Mag, Retic: pending

## 2023-08-22 ENCOUNTER — APPOINTMENT (OUTPATIENT)
Dept: RHEUMATOLOGY | Facility: CLINIC | Age: 53
End: 2023-08-22
Payer: MEDICAID

## 2023-08-22 VITALS
OXYGEN SATURATION: 99 % | BODY MASS INDEX: 22.33 KG/M2 | SYSTOLIC BLOOD PRESSURE: 120 MMHG | HEIGHT: 65 IN | WEIGHT: 134 LBS | DIASTOLIC BLOOD PRESSURE: 70 MMHG | HEART RATE: 71 BPM | TEMPERATURE: 98 F

## 2023-08-22 PROCEDURE — 99214 OFFICE O/P EST MOD 30 MIN: CPT

## 2023-10-12 ENCOUNTER — OUTPATIENT (OUTPATIENT)
Dept: OUTPATIENT SERVICES | Facility: HOSPITAL | Age: 53
LOS: 1 days | End: 2023-10-12
Payer: MEDICAID

## 2023-10-12 DIAGNOSIS — G47.33 OBSTRUCTIVE SLEEP APNEA (ADULT) (PEDIATRIC): ICD-10-CM

## 2023-10-12 PROCEDURE — 95810 POLYSOM 6/> YRS 4/> PARAM: CPT

## 2023-10-22 PROBLEM — K57.90 DIVERTICULOSIS: Status: RESOLVED | Noted: 2022-04-17 | Resolved: 2022-04-17

## 2023-10-23 ENCOUNTER — APPOINTMENT (OUTPATIENT)
Dept: INTERNAL MEDICINE | Facility: CLINIC | Age: 53
End: 2023-10-23

## 2023-10-23 DIAGNOSIS — K57.90 DIVERTICULOSIS OF INTESTINE, PART UNSPECIFIED, W/OUT PERFORATION OR ABSCESS W/OUT BLEEDING: ICD-10-CM

## 2023-11-14 ENCOUNTER — APPOINTMENT (OUTPATIENT)
Dept: RHEUMATOLOGY | Facility: CLINIC | Age: 53
End: 2023-11-14

## 2023-12-01 ENCOUNTER — APPOINTMENT (OUTPATIENT)
Dept: INTERNAL MEDICINE | Facility: CLINIC | Age: 53
End: 2023-12-01
Payer: MEDICAID

## 2023-12-01 VITALS
TEMPERATURE: 98.3 F | RESPIRATION RATE: 16 BRPM | DIASTOLIC BLOOD PRESSURE: 80 MMHG | WEIGHT: 135 LBS | OXYGEN SATURATION: 98 % | HEIGHT: 65.5 IN | BODY MASS INDEX: 22.22 KG/M2 | SYSTOLIC BLOOD PRESSURE: 142 MMHG | HEART RATE: 79 BPM

## 2023-12-01 VITALS — SYSTOLIC BLOOD PRESSURE: 132 MMHG | DIASTOLIC BLOOD PRESSURE: 80 MMHG

## 2023-12-01 DIAGNOSIS — U09.9 OTHER FORMS OF DYSPNEA: ICD-10-CM

## 2023-12-01 DIAGNOSIS — Z86.39 PERSONAL HISTORY OF OTHER ENDOCRINE, NUTRITIONAL AND METABOLIC DISEASE: ICD-10-CM

## 2023-12-01 DIAGNOSIS — U09.9 POST COVID-19 CONDITION, UNSPECIFIED: ICD-10-CM

## 2023-12-01 DIAGNOSIS — Z87.39 PERSONAL HISTORY OF OTHER DISEASES OF THE MUSCULOSKELETAL SYSTEM AND CONNECTIVE TISSUE: ICD-10-CM

## 2023-12-01 DIAGNOSIS — N94.3 PREMENSTRUAL TENSION SYNDROME: ICD-10-CM

## 2023-12-01 DIAGNOSIS — M19.049 PRIMARY OSTEOARTHRITIS, UNSPECIFIED HAND: ICD-10-CM

## 2023-12-01 DIAGNOSIS — R06.09 OTHER FORMS OF DYSPNEA: ICD-10-CM

## 2023-12-01 DIAGNOSIS — Z86.2 PERSONAL HISTORY OF DISEASES OF THE BLOOD AND BLOOD-FORMING ORGANS AND CERTAIN DISORDERS INVOLVING THE IMMUNE MECHANISM: ICD-10-CM

## 2023-12-01 DIAGNOSIS — R51.9 HEADACHE, UNSPECIFIED: ICD-10-CM

## 2023-12-01 DIAGNOSIS — Z00.00 ENCOUNTER FOR GENERAL ADULT MEDICAL EXAMINATION W/OUT ABNORMAL FINDINGS: ICD-10-CM

## 2023-12-01 PROCEDURE — 99396 PREV VISIT EST AGE 40-64: CPT | Mod: 25

## 2023-12-01 RX ORDER — CALCIUM CARBONATE/VITAMIN D3 600 MG-10
TABLET ORAL
Refills: 0 | Status: ACTIVE | COMMUNITY

## 2023-12-01 RX ORDER — OMEGA-3/DHA/EPA/FISH OIL 300-1000MG
CAPSULE ORAL
Refills: 0 | Status: ACTIVE | COMMUNITY

## 2023-12-02 LAB
25(OH)D3 SERPL-MCNC: 43.4 NG/ML
ALBUMIN SERPL ELPH-MCNC: 4.4 G/DL
ALP BLD-CCNC: 74 U/L
ALT SERPL-CCNC: 17 U/L
ANION GAP SERPL CALC-SCNC: 10 MMOL/L
APPEARANCE: CLEAR
AST SERPL-CCNC: 17 U/L
BASOPHILS # BLD AUTO: 0.03 K/UL
BASOPHILS NFR BLD AUTO: 0.7 %
BILIRUB SERPL-MCNC: 0.5 MG/DL
BILIRUBIN URINE: NEGATIVE
BLOOD URINE: NEGATIVE
BUN SERPL-MCNC: 11 MG/DL
CALCIUM SERPL-MCNC: 9.4 MG/DL
CHLORIDE SERPL-SCNC: 102 MMOL/L
CHOLEST SERPL-MCNC: 205 MG/DL
CO2 SERPL-SCNC: 27 MMOL/L
COLOR: YELLOW
CREAT SERPL-MCNC: 0.86 MG/DL
EGFR: 81 ML/MIN/1.73M2
EOSINOPHIL # BLD AUTO: 0.18 K/UL
EOSINOPHIL NFR BLD AUTO: 4 %
ESTIMATED AVERAGE GLUCOSE: 88 MG/DL
FERRITIN SERPL-MCNC: 42 NG/ML
FOLATE SERPL-MCNC: 18.7 NG/ML
GLUCOSE QUALITATIVE U: NEGATIVE MG/DL
GLUCOSE SERPL-MCNC: 90 MG/DL
HBA1C MFR BLD HPLC: 4.7 %
HCT VFR BLD CALC: 38.8 %
HDLC SERPL-MCNC: 70 MG/DL
HGB BLD-MCNC: 12 G/DL
IMM GRANULOCYTES NFR BLD AUTO: 0.4 %
IRON SATN MFR SERPL: 30 %
IRON SERPL-MCNC: 93 UG/DL
KETONES URINE: NEGATIVE MG/DL
LDLC SERPL CALC-MCNC: 118 MG/DL
LEUKOCYTE ESTERASE URINE: NEGATIVE
LYMPHOCYTES # BLD AUTO: 0.89 K/UL
LYMPHOCYTES NFR BLD AUTO: 19.6 %
MAN DIFF?: NORMAL
MCHC RBC-ENTMCNC: 28 PG
MCHC RBC-ENTMCNC: 30.9 GM/DL
MCV RBC AUTO: 90.4 FL
MONOCYTES # BLD AUTO: 0.27 K/UL
MONOCYTES NFR BLD AUTO: 5.9 %
NEUTROPHILS # BLD AUTO: 3.16 K/UL
NEUTROPHILS NFR BLD AUTO: 69.4 %
NITRITE URINE: NEGATIVE
NONHDLC SERPL-MCNC: 134 MG/DL
PH URINE: 7
PLATELET # BLD AUTO: 252 K/UL
POTASSIUM SERPL-SCNC: 4.2 MMOL/L
PROT SERPL-MCNC: 6.7 G/DL
PROTEIN URINE: NEGATIVE MG/DL
RBC # BLD: 4.29 M/UL
RBC # FLD: 13.2 %
SODIUM SERPL-SCNC: 139 MMOL/L
SPECIFIC GRAVITY URINE: 1
TIBC SERPL-MCNC: 308 UG/DL
TRIGL SERPL-MCNC: 93 MG/DL
TSH SERPL-ACNC: 2.2 UIU/ML
UIBC SERPL-MCNC: 215 UG/DL
UROBILINOGEN URINE: 0.2 MG/DL
VIT B12 SERPL-MCNC: 638 PG/ML
WBC # FLD AUTO: 4.55 K/UL

## 2023-12-13 ENCOUNTER — OUTPATIENT (OUTPATIENT)
Dept: OUTPATIENT SERVICES | Facility: HOSPITAL | Age: 53
LOS: 1 days | Discharge: HOME | End: 2023-12-13

## 2023-12-13 DIAGNOSIS — D50.0 IRON DEFICIENCY ANEMIA SECONDARY TO BLOOD LOSS (CHRONIC): ICD-10-CM

## 2023-12-13 DIAGNOSIS — N92.0 EXCESSIVE AND FREQUENT MENSTRUATION WITH REGULAR CYCLE: ICD-10-CM

## 2023-12-15 ENCOUNTER — RESULT REVIEW (OUTPATIENT)
Age: 53
End: 2023-12-15

## 2023-12-15 ENCOUNTER — APPOINTMENT (OUTPATIENT)
Dept: HEMATOLOGY ONCOLOGY | Facility: CLINIC | Age: 53
End: 2023-12-15

## 2023-12-15 LAB
BASOPHILS # BLD AUTO: 0.01 K/UL — SIGNIFICANT CHANGE UP (ref 0–0.2)
BASOPHILS # BLD AUTO: 0.01 K/UL — SIGNIFICANT CHANGE UP (ref 0–0.2)
BASOPHILS NFR BLD AUTO: 0.2 % — SIGNIFICANT CHANGE UP (ref 0–2)
BASOPHILS NFR BLD AUTO: 0.2 % — SIGNIFICANT CHANGE UP (ref 0–2)
EOSINOPHIL # BLD AUTO: 0.18 K/UL — SIGNIFICANT CHANGE UP (ref 0–0.5)
EOSINOPHIL # BLD AUTO: 0.18 K/UL — SIGNIFICANT CHANGE UP (ref 0–0.5)
EOSINOPHIL NFR BLD AUTO: 3.4 % — SIGNIFICANT CHANGE UP (ref 0–6)
EOSINOPHIL NFR BLD AUTO: 3.4 % — SIGNIFICANT CHANGE UP (ref 0–6)
HCT VFR BLD CALC: 37.9 % — SIGNIFICANT CHANGE UP (ref 34.5–45)
HCT VFR BLD CALC: 37.9 % — SIGNIFICANT CHANGE UP (ref 34.5–45)
HGB BLD-MCNC: 12.6 G/DL — SIGNIFICANT CHANGE UP (ref 11.5–15.5)
HGB BLD-MCNC: 12.6 G/DL — SIGNIFICANT CHANGE UP (ref 11.5–15.5)
IMM GRANULOCYTES NFR BLD AUTO: 0.2 % — SIGNIFICANT CHANGE UP (ref 0–0.9)
IMM GRANULOCYTES NFR BLD AUTO: 0.2 % — SIGNIFICANT CHANGE UP (ref 0–0.9)
LYMPHOCYTES # BLD AUTO: 0.97 K/UL — LOW (ref 1–3.3)
LYMPHOCYTES # BLD AUTO: 0.97 K/UL — LOW (ref 1–3.3)
LYMPHOCYTES # BLD AUTO: 18.5 % — SIGNIFICANT CHANGE UP (ref 13–44)
LYMPHOCYTES # BLD AUTO: 18.5 % — SIGNIFICANT CHANGE UP (ref 13–44)
MCHC RBC-ENTMCNC: 28.3 PG — SIGNIFICANT CHANGE UP (ref 27–34)
MCHC RBC-ENTMCNC: 28.3 PG — SIGNIFICANT CHANGE UP (ref 27–34)
MCHC RBC-ENTMCNC: 33.2 GM/DL — SIGNIFICANT CHANGE UP (ref 32–36)
MCHC RBC-ENTMCNC: 33.2 GM/DL — SIGNIFICANT CHANGE UP (ref 32–36)
MCV RBC AUTO: 85.2 FL — SIGNIFICANT CHANGE UP (ref 80–100)
MCV RBC AUTO: 85.2 FL — SIGNIFICANT CHANGE UP (ref 80–100)
MONOCYTES # BLD AUTO: 0.39 K/UL — SIGNIFICANT CHANGE UP (ref 0–0.9)
MONOCYTES # BLD AUTO: 0.39 K/UL — SIGNIFICANT CHANGE UP (ref 0–0.9)
MONOCYTES NFR BLD AUTO: 7.4 % — SIGNIFICANT CHANGE UP (ref 2–14)
MONOCYTES NFR BLD AUTO: 7.4 % — SIGNIFICANT CHANGE UP (ref 2–14)
NEUTROPHILS # BLD AUTO: 3.68 K/UL — SIGNIFICANT CHANGE UP (ref 1.8–7.4)
NEUTROPHILS # BLD AUTO: 3.68 K/UL — SIGNIFICANT CHANGE UP (ref 1.8–7.4)
NEUTROPHILS NFR BLD AUTO: 70.3 % — SIGNIFICANT CHANGE UP (ref 43–77)
NEUTROPHILS NFR BLD AUTO: 70.3 % — SIGNIFICANT CHANGE UP (ref 43–77)
NRBC # BLD: 0 /100 WBCS — SIGNIFICANT CHANGE UP (ref 0–0)
NRBC # BLD: 0 /100 WBCS — SIGNIFICANT CHANGE UP (ref 0–0)
PLATELET # BLD AUTO: 236 K/UL — SIGNIFICANT CHANGE UP (ref 150–400)
PLATELET # BLD AUTO: 236 K/UL — SIGNIFICANT CHANGE UP (ref 150–400)
RBC # BLD: 4.45 M/UL — SIGNIFICANT CHANGE UP (ref 3.8–5.2)
RBC # BLD: 4.45 M/UL — SIGNIFICANT CHANGE UP (ref 3.8–5.2)
RBC # FLD: 12.3 % — SIGNIFICANT CHANGE UP (ref 10.3–14.5)
RBC # FLD: 12.3 % — SIGNIFICANT CHANGE UP (ref 10.3–14.5)
WBC # BLD: 5.24 K/UL — SIGNIFICANT CHANGE UP (ref 3.8–10.5)
WBC # BLD: 5.24 K/UL — SIGNIFICANT CHANGE UP (ref 3.8–10.5)
WBC # FLD AUTO: 5.24 K/UL — SIGNIFICANT CHANGE UP (ref 3.8–10.5)
WBC # FLD AUTO: 5.24 K/UL — SIGNIFICANT CHANGE UP (ref 3.8–10.5)

## 2023-12-16 LAB
ERYTHROCYTE [SEDIMENTATION RATE] IN BLOOD: 21 MM/HR — HIGH (ref 0–20)
ERYTHROCYTE [SEDIMENTATION RATE] IN BLOOD: 21 MM/HR — HIGH (ref 0–20)
FERRITIN SERPL-MCNC: 33 NG/ML — SIGNIFICANT CHANGE UP (ref 13–330)
FERRITIN SERPL-MCNC: 33 NG/ML — SIGNIFICANT CHANGE UP (ref 13–330)
IRON SATN MFR SERPL: 26 % — SIGNIFICANT CHANGE UP (ref 14–50)
IRON SATN MFR SERPL: 26 % — SIGNIFICANT CHANGE UP (ref 14–50)
IRON SATN MFR SERPL: 81 UG/DL — SIGNIFICANT CHANGE UP (ref 30–160)
IRON SATN MFR SERPL: 81 UG/DL — SIGNIFICANT CHANGE UP (ref 30–160)
TIBC SERPL-MCNC: 315 UG/DL — SIGNIFICANT CHANGE UP (ref 220–430)
TIBC SERPL-MCNC: 315 UG/DL — SIGNIFICANT CHANGE UP (ref 220–430)
UIBC SERPL-MCNC: 234 UG/DL — SIGNIFICANT CHANGE UP (ref 110–370)
UIBC SERPL-MCNC: 234 UG/DL — SIGNIFICANT CHANGE UP (ref 110–370)

## 2023-12-18 ENCOUNTER — APPOINTMENT (OUTPATIENT)
Dept: NEUROLOGY | Facility: CLINIC | Age: 53
End: 2023-12-18

## 2023-12-22 ENCOUNTER — APPOINTMENT (OUTPATIENT)
Dept: INFUSION THERAPY | Facility: CLINIC | Age: 53
End: 2023-12-22

## 2024-01-02 ENCOUNTER — APPOINTMENT (OUTPATIENT)
Dept: DERMATOLOGY | Facility: CLINIC | Age: 54
End: 2024-01-02

## 2024-03-15 ENCOUNTER — NON-APPOINTMENT (OUTPATIENT)
Age: 54
End: 2024-03-15

## 2024-03-20 ENCOUNTER — OUTPATIENT (OUTPATIENT)
Dept: OUTPATIENT SERVICES | Facility: HOSPITAL | Age: 54
LOS: 1 days | Discharge: HOME | End: 2024-03-20

## 2024-03-20 DIAGNOSIS — D50.0 IRON DEFICIENCY ANEMIA SECONDARY TO BLOOD LOSS (CHRONIC): ICD-10-CM

## 2024-03-20 DIAGNOSIS — N92.0 EXCESSIVE AND FREQUENT MENSTRUATION WITH REGULAR CYCLE: ICD-10-CM

## 2024-03-22 ENCOUNTER — RESULT REVIEW (OUTPATIENT)
Age: 54
End: 2024-03-22

## 2024-03-22 ENCOUNTER — APPOINTMENT (OUTPATIENT)
Dept: HEMATOLOGY ONCOLOGY | Facility: CLINIC | Age: 54
End: 2024-03-22

## 2024-03-22 LAB
BASOPHILS # BLD AUTO: 0.03 K/UL — SIGNIFICANT CHANGE UP (ref 0–0.2)
BASOPHILS NFR BLD AUTO: 0.7 % — SIGNIFICANT CHANGE UP (ref 0–2)
EOSINOPHIL # BLD AUTO: 0.07 K/UL — SIGNIFICANT CHANGE UP (ref 0–0.5)
EOSINOPHIL NFR BLD AUTO: 1.6 % — SIGNIFICANT CHANGE UP (ref 0–6)
HCT VFR BLD CALC: 30.9 % — LOW (ref 34.5–45)
HGB BLD-MCNC: 10.2 G/DL — LOW (ref 11.5–15.5)
IMM GRANULOCYTES NFR BLD AUTO: 0.2 % — SIGNIFICANT CHANGE UP (ref 0–0.9)
LYMPHOCYTES # BLD AUTO: 0.9 K/UL — LOW (ref 1–3.3)
LYMPHOCYTES # BLD AUTO: 20.4 % — SIGNIFICANT CHANGE UP (ref 13–44)
MCHC RBC-ENTMCNC: 27.6 PG — SIGNIFICANT CHANGE UP (ref 27–34)
MCHC RBC-ENTMCNC: 33 GM/DL — SIGNIFICANT CHANGE UP (ref 32–36)
MCV RBC AUTO: 83.5 FL — SIGNIFICANT CHANGE UP (ref 80–100)
MONOCYTES # BLD AUTO: 0.27 K/UL — SIGNIFICANT CHANGE UP (ref 0–0.9)
MONOCYTES NFR BLD AUTO: 6.1 % — SIGNIFICANT CHANGE UP (ref 2–14)
NEUTROPHILS # BLD AUTO: 3.13 K/UL — SIGNIFICANT CHANGE UP (ref 1.8–7.4)
NEUTROPHILS NFR BLD AUTO: 71 % — SIGNIFICANT CHANGE UP (ref 43–77)
NRBC # BLD: 0 /100 WBCS — SIGNIFICANT CHANGE UP (ref 0–0)
PLATELET # BLD AUTO: 243 K/UL — SIGNIFICANT CHANGE UP (ref 150–400)
RBC # BLD: 3.7 M/UL — LOW (ref 3.8–5.2)
RBC # FLD: 14 % — SIGNIFICANT CHANGE UP (ref 10.3–14.5)
WBC # BLD: 4.41 K/UL — SIGNIFICANT CHANGE UP (ref 3.8–10.5)
WBC # FLD AUTO: 4.41 K/UL — SIGNIFICANT CHANGE UP (ref 3.8–10.5)

## 2024-03-23 LAB
24R-OH-CALCIDIOL SERPL-MCNC: 95.3 NG/ML — HIGH (ref 30–80)
ERYTHROCYTE [SEDIMENTATION RATE] IN BLOOD: 8 MM/HR — SIGNIFICANT CHANGE UP (ref 0–20)
FERRITIN SERPL-MCNC: 14 NG/ML — SIGNIFICANT CHANGE UP (ref 13–330)
IRON SATN MFR SERPL: 13 % — LOW (ref 14–50)
IRON SATN MFR SERPL: 46 UG/DL — SIGNIFICANT CHANGE UP (ref 30–160)
TIBC SERPL-MCNC: 344 UG/DL — SIGNIFICANT CHANGE UP (ref 220–430)
UIBC SERPL-MCNC: 298 UG/DL — SIGNIFICANT CHANGE UP (ref 110–370)

## 2024-03-25 ENCOUNTER — NON-APPOINTMENT (OUTPATIENT)
Age: 54
End: 2024-03-25

## 2024-03-27 ENCOUNTER — NON-APPOINTMENT (OUTPATIENT)
Age: 54
End: 2024-03-27

## 2024-03-29 ENCOUNTER — NON-APPOINTMENT (OUTPATIENT)
Age: 54
End: 2024-03-29

## 2024-04-11 ENCOUNTER — APPOINTMENT (OUTPATIENT)
Dept: HEMATOLOGY ONCOLOGY | Facility: CLINIC | Age: 54
End: 2024-04-11
Payer: MEDICAID

## 2024-04-11 ENCOUNTER — APPOINTMENT (OUTPATIENT)
Dept: INFUSION THERAPY | Facility: CLINIC | Age: 54
End: 2024-04-11
Payer: MEDICAID

## 2024-04-11 VITALS
HEART RATE: 70 BPM | TEMPERATURE: 98.1 F | HEIGHT: 65.5 IN | DIASTOLIC BLOOD PRESSURE: 88 MMHG | BODY MASS INDEX: 23.07 KG/M2 | WEIGHT: 140.13 LBS | RESPIRATION RATE: 16 BRPM | OXYGEN SATURATION: 100 % | SYSTOLIC BLOOD PRESSURE: 127 MMHG

## 2024-04-11 DIAGNOSIS — N93.9 ABNORMAL UTERINE AND VAGINAL BLEEDING, UNSPECIFIED: ICD-10-CM

## 2024-04-11 DIAGNOSIS — K90.9 INTESTINAL MALABSORPTION, UNSPECIFIED: ICD-10-CM

## 2024-04-11 DIAGNOSIS — D50.0 IRON DEFICIENCY ANEMIA SECONDARY TO BLOOD LOSS (CHRONIC): ICD-10-CM

## 2024-04-11 PROCEDURE — 99214 OFFICE O/P EST MOD 30 MIN: CPT

## 2024-04-11 PROCEDURE — G2211 COMPLEX E/M VISIT ADD ON: CPT | Mod: NC,1L

## 2024-04-15 PROBLEM — K90.9 IMPAIRED INTESTINAL ABSORPTION: Status: ACTIVE | Noted: 2024-04-12

## 2024-04-15 PROBLEM — D50.0 IRON DEFICIENCY ANEMIA DUE TO CHRONIC BLOOD LOSS: Status: ACTIVE | Noted: 2021-11-29

## 2024-04-15 PROBLEM — N93.9 ABNORMAL UTERINE BLEEDING (AUB): Status: ACTIVE | Noted: 2021-12-13

## 2024-04-15 RX ORDER — TIZANIDINE 4 MG/1
4 TABLET ORAL
Qty: 60 | Refills: 1 | Status: DISCONTINUED | COMMUNITY
Start: 2023-03-21 | End: 2024-04-15

## 2024-04-15 RX ORDER — AMMONIUM LACTATE 12 %
12 CREAM (GRAM) TOPICAL
Qty: 1 | Refills: 5 | Status: DISCONTINUED | COMMUNITY
Start: 2023-01-03 | End: 2024-04-15

## 2024-04-15 RX ORDER — FLUOXETINE HYDROCHLORIDE 20 MG/1
20 CAPSULE ORAL
Qty: 90 | Refills: 1 | Status: DISCONTINUED | COMMUNITY
Start: 2021-10-04 | End: 2024-04-15

## 2024-04-15 RX ORDER — MELOXICAM 15 MG/1
15 TABLET ORAL
Qty: 30 | Refills: 0 | Status: DISCONTINUED | COMMUNITY
Start: 2023-03-21 | End: 2024-04-15

## 2024-04-15 RX ORDER — TRIAMCINOLONE ACETONIDE 1 MG/G
0.1 CREAM TOPICAL
Qty: 1 | Refills: 0 | Status: DISCONTINUED | COMMUNITY
Start: 2023-01-03 | End: 2024-04-15

## 2024-04-15 RX ORDER — HYDROXYCHLOROQUINE SULFATE 200 MG/1
200 TABLET, FILM COATED ORAL
Qty: 60 | Refills: 2 | Status: DISCONTINUED | COMMUNITY
Start: 2023-08-22 | End: 2024-04-15

## 2024-04-15 NOTE — REVIEW OF SYSTEMS
[Patient Intake Form Reviewed] : Patient intake form was reviewed [Fatigue] : fatigue [Joint Pain] : joint pain [Joint Stiffness] : joint stiffness [FreeTextEntry4] : Recent dental abscess [FreeTextEntry9] : RLS, OA hands [Dysmenorrhea/Abn Vaginal Bleeding] : dysmenorrhea/abnormal vaginal bleeding [Negative] : Genitourinary

## 2024-04-15 NOTE — HISTORY OF PRESENT ILLNESS
[de-identified] : TOMASA VALLEJO is a 54 y.o. F with a PMH significant for chronic headaches, who we are following for recurrent MILKA.   10/21/21 - Hgb: 10.9,  MCV: 91.1, B12: 730, Folate: 7.0, Ferritin: 19, TSAT: 68% 21 - Initially seen in our office.  Reported history of chronic anemia for many years.  Her Hgb was down to 7 in the past. She has been taking Ferrous sulfate 65 mg of elemental iron daily for at least three years (takes on empty stomach).  Did not tolerate twice daily iron - constipation.  Menses heavy for years. Has two children. Chronic fatigue.  No GI c/o. No family hx of anemia.    Etiology of her iron deficiency was attributed to ongoing menstrual blood losses. Given IV Feraheme x 2 - Last 1 month after with Hgb: 12.0, Ferritin 244   22 - Colonoscopy (Jean De Leon) - Large hemorrhoids, non-bleeding diverticula.   22 - Hgb: 12.0, but Ferritin down to 18.  Was advised to come for Feraheme x 2 but only came for 1 infusion, no f/u after.  Patient reports didn't come for 2nd IV iron infusion in spring as her mom got sick, then .  She then had COVID.   10/24/22 - Hgb: 9.3,  Hct: 30.4,  MCV: 80.0,  Plts: 376, Ferritin: 4, TSAT: 4, B12: 443, Folate: 15.2 Feraheme x 2  23 - Hgb: 12.0, MCV: 91.5, Ferritin: 61, TSAT: 8  Feraheme x 3 23 - WBC: 4.65, Hgb: 10.6,  Hct: 33.2, MCV: 92.5,  Plts: 224, Ferritin: 181, TSAT: 36% 23 - Hgb:12.8.  Suspect anemia in July was from a viral process that has resolved.  [de-identified] : 12/15/23 - Hgb: 12.6, MCV: 85.2, Ferritin: 33, TSAT: 26% Was scheduled for Feraheme x 1 12/22/23 but canceled.  Patient asked to come in for labs in March - was symptomatic with SOB and fatigue.  3/22/24 - Hgb: 10.2, MCV: 83.5, Ferritin: 14, TSAT: 13% -> Feraheme x 3 scheduled.  Here for #1/3  Patient reports that she has stopped all of her prescription meds.  She started wild yam cream in August - she feels that this has helped to regulate her period and that they are more normal now.  They are still on the heavy side but are lasting only 4 days instead of 7.  She has also started on a bunch of other supplements such as diatomaceous earth to help with gut health and cardiomiracle.  She is very concerned about why she is "not absorbing" iron.  Also is interested in having a D-dimer done as well as a heavy metal panel.  Denies any other changes in her family, medical, or social history since her last visit of 8/2/23.

## 2024-04-15 NOTE — PHYSICAL EXAM
[Normal] : affect appropriate [de-identified] : anicteric [de-identified] : no c/c/e [de-identified] : no rashes

## 2024-04-16 DIAGNOSIS — N93.9 ABNORMAL UTERINE AND VAGINAL BLEEDING, UNSPECIFIED: ICD-10-CM

## 2024-04-16 DIAGNOSIS — K90.9 INTESTINAL MALABSORPTION, UNSPECIFIED: ICD-10-CM

## 2024-04-18 ENCOUNTER — RESULT REVIEW (OUTPATIENT)
Age: 54
End: 2024-04-18

## 2024-04-18 ENCOUNTER — APPOINTMENT (OUTPATIENT)
Dept: INFUSION THERAPY | Facility: CLINIC | Age: 54
End: 2024-04-18

## 2024-04-18 VITALS
HEIGHT: 65.5 IN | BODY MASS INDEX: 22.39 KG/M2 | TEMPERATURE: 98.5 F | DIASTOLIC BLOOD PRESSURE: 72 MMHG | WEIGHT: 136 LBS | HEART RATE: 82 BPM | SYSTOLIC BLOOD PRESSURE: 112 MMHG | OXYGEN SATURATION: 99 %

## 2024-04-24 LAB
ARSENIC SERPL-MCNC: 2 UG/L — SIGNIFICANT CHANGE UP (ref 0–9)
CADMIUM SERPL-MCNC: <0.5 UG/L — SIGNIFICANT CHANGE UP (ref 0–1.2)
LEAD BLD-MCNC: <1 UG/DL — SIGNIFICANT CHANGE UP (ref 0–3.4)
MERCURY SERPL-MCNC: <1 UG/L — SIGNIFICANT CHANGE UP (ref 0–14.9)

## 2024-04-25 ENCOUNTER — APPOINTMENT (OUTPATIENT)
Dept: INFUSION THERAPY | Facility: CLINIC | Age: 54
End: 2024-04-25

## 2024-06-06 ENCOUNTER — APPOINTMENT (OUTPATIENT)
Dept: NEUROLOGY | Facility: CLINIC | Age: 54
End: 2024-06-06
Payer: MEDICAID

## 2024-06-06 VITALS
TEMPERATURE: 98.2 F | HEART RATE: 71 BPM | SYSTOLIC BLOOD PRESSURE: 156 MMHG | WEIGHT: 132 LBS | HEIGHT: 65.5 IN | BODY MASS INDEX: 21.73 KG/M2 | DIASTOLIC BLOOD PRESSURE: 88 MMHG

## 2024-06-06 PROCEDURE — 99214 OFFICE O/P EST MOD 30 MIN: CPT

## 2024-06-06 RX ORDER — RIZATRIPTAN BENZOATE 10 MG/1
10 TABLET ORAL
Qty: 36 | Refills: 2 | Status: ACTIVE | COMMUNITY
Start: 2021-11-17 | End: 1900-01-01

## 2024-06-06 NOTE — PHYSICAL EXAM
[General Appearance - Alert] : alert [General Appearance - In No Acute Distress] : in no acute distress [General Appearance - Well Nourished] : well nourished [General Appearance - Well Developed] : well developed [General Appearance - Well-Appearing] : healthy appearing [] : normal voice and communication [Oriented To Time, Place, And Person] : oriented to person, place, and time [Impaired Insight] : insight and judgment were intact [Affect] : the affect was normal [Mood] : the mood was normal [Memory Recent] : recent memory was not impaired [Memory Remote] : remote memory was not impaired [Over the Past 2 Weeks, Have You Felt Down, Depressed, or Hopeless?] : 1.) Over the past 2 weeks, have you felt down, depressed, or hopeless? No [Over the Past 2 Weeks, Have You Felt Little Interest or Pleasure Doing Things?] : 2.) Over the past 2 weeks, have you felt little interest or pleasure doing things? No

## 2024-06-06 NOTE — ASSESSMENT
[FreeTextEntry1] : 54-year-old woman history of episodic migraine, stable improved over the last several years, rizatriptan 10 mg usually with works very well as a acute therapy. Reviewed and discussed treatment options, trigger management. Patient has not tried any new medications, discussed the use of Ubrelvy, Nurtec. Plan: Will renew rizatriptan 10 mg as needed for headache, can repeat within 2 hours, maximum dose of 30 mg/day. Return to office, 1 year.

## 2024-06-06 NOTE — HISTORY OF PRESENT ILLNESS
[FreeTextEntry1] : 54-year-old right-handed history of migraine headaches here for follow-up visit, reports headaches overall have improved, maybe once or twice a month.  She is notices if she misses a meal, stresses out, will get a headache usually moderate to severe takes rizatriptan 10 mg usually with excellent results, rarely does not take a second 1.  No side effects reported. Been feeling pretty well, no new medical issues or concerns, no recent hospitalization no new medications.  Has been trying to lead a more simple life, [Headache] : headache [Aura] : no aura [Dizziness] : no dizziness [Photophobia] : photophobia [Phonophobia] : phonophobia [Scotoma] : scotoma [Numbness] : no numbness [Tingling] : no tingling [Scalp Tenderness] : no scalp tenderness [___ Times Per Month] : [unfilled] times per month [> 4 hours] : > 4 hours [improved] : improved [Stable] : The patient reports ~his/her~ symptoms since the last visit are stable

## 2024-07-16 ENCOUNTER — OUTPATIENT (OUTPATIENT)
Dept: OUTPATIENT SERVICES | Facility: HOSPITAL | Age: 54
LOS: 1 days | Discharge: HOME | End: 2024-07-16

## 2024-07-16 DIAGNOSIS — D50.0 IRON DEFICIENCY ANEMIA SECONDARY TO BLOOD LOSS (CHRONIC): ICD-10-CM

## 2024-07-16 DIAGNOSIS — N92.0 EXCESSIVE AND FREQUENT MENSTRUATION WITH REGULAR CYCLE: ICD-10-CM

## 2024-07-18 ENCOUNTER — RESULT REVIEW (OUTPATIENT)
Age: 54
End: 2024-07-18

## 2024-07-18 ENCOUNTER — APPOINTMENT (OUTPATIENT)
Dept: HEMATOLOGY ONCOLOGY | Facility: CLINIC | Age: 54
End: 2024-07-18

## 2024-07-18 DIAGNOSIS — T45.2X1A POISONING BY VITAMINS, ACCIDENTAL (UNINTENTIONAL), INITIAL ENCOUNTER: ICD-10-CM

## 2024-07-18 LAB
BASOPHILS # BLD AUTO: 0.02 K/UL — SIGNIFICANT CHANGE UP (ref 0–0.2)
BASOPHILS NFR BLD AUTO: 0.3 % — SIGNIFICANT CHANGE UP (ref 0–2)
EOSINOPHIL # BLD AUTO: 0.08 K/UL — SIGNIFICANT CHANGE UP (ref 0–0.5)
EOSINOPHIL NFR BLD AUTO: 1.3 % — SIGNIFICANT CHANGE UP (ref 0–6)
FERRITIN SERPL-MCNC: 354 NG/ML — HIGH (ref 13–330)
HCT VFR BLD CALC: 39.1 % — SIGNIFICANT CHANGE UP (ref 34.5–45)
HGB BLD-MCNC: 13 G/DL — SIGNIFICANT CHANGE UP (ref 11.5–15.5)
IMM GRANULOCYTES NFR BLD AUTO: 0.3 % — SIGNIFICANT CHANGE UP (ref 0–0.9)
IRON SATN MFR SERPL: 125 UG/DL — SIGNIFICANT CHANGE UP (ref 30–160)
IRON SATN MFR SERPL: 46 % — SIGNIFICANT CHANGE UP (ref 14–50)
LYMPHOCYTES # BLD AUTO: 0.86 K/UL — LOW (ref 1–3.3)
LYMPHOCYTES # BLD AUTO: 13.7 % — SIGNIFICANT CHANGE UP (ref 13–44)
MCHC RBC-ENTMCNC: 27.7 PG — SIGNIFICANT CHANGE UP (ref 27–34)
MCHC RBC-ENTMCNC: 33.2 GM/DL — SIGNIFICANT CHANGE UP (ref 32–36)
MCV RBC AUTO: 83.2 FL — SIGNIFICANT CHANGE UP (ref 80–100)
MONOCYTES # BLD AUTO: 0.44 K/UL — SIGNIFICANT CHANGE UP (ref 0–0.9)
MONOCYTES NFR BLD AUTO: 7 % — SIGNIFICANT CHANGE UP (ref 2–14)
NEUTROPHILS # BLD AUTO: 4.85 K/UL — SIGNIFICANT CHANGE UP (ref 1.8–7.4)
NEUTROPHILS NFR BLD AUTO: 77.4 % — HIGH (ref 43–77)
NRBC # BLD: 0 /100 WBCS — SIGNIFICANT CHANGE UP (ref 0–0)
PLATELET # BLD AUTO: 245 K/UL — SIGNIFICANT CHANGE UP (ref 150–400)
RBC # BLD: 4.7 M/UL — SIGNIFICANT CHANGE UP (ref 3.8–5.2)
RBC # FLD: 15 % — HIGH (ref 10.3–14.5)
TIBC SERPL-MCNC: 270 UG/DL — SIGNIFICANT CHANGE UP (ref 220–430)
UIBC SERPL-MCNC: 145 UG/DL — SIGNIFICANT CHANGE UP (ref 110–370)
WBC # BLD: 6.27 K/UL — SIGNIFICANT CHANGE UP (ref 3.8–10.5)
WBC # FLD AUTO: 6.27 K/UL — SIGNIFICANT CHANGE UP (ref 3.8–10.5)

## 2024-07-19 ENCOUNTER — NON-APPOINTMENT (OUTPATIENT)
Age: 54
End: 2024-07-19

## 2024-07-19 LAB
24R-OH-CALCIDIOL SERPL-MCNC: 53 NG/ML — SIGNIFICANT CHANGE UP (ref 30–80)
ERYTHROCYTE [SEDIMENTATION RATE] IN BLOOD: 20 MM/HR — SIGNIFICANT CHANGE UP (ref 0–20)

## 2024-08-06 ENCOUNTER — APPOINTMENT (OUTPATIENT)
Dept: INTERNAL MEDICINE | Facility: CLINIC | Age: 54
End: 2024-08-06

## 2024-08-06 PROBLEM — D50.9 IDA (IRON DEFICIENCY ANEMIA): Status: ACTIVE | Noted: 2024-08-06

## 2024-08-06 PROCEDURE — 99214 OFFICE O/P EST MOD 30 MIN: CPT

## 2024-08-06 PROCEDURE — G2211 COMPLEX E/M VISIT ADD ON: CPT | Mod: NC,1L

## 2024-08-06 NOTE — PHYSICAL EXAM
[TextEntry] : General: NAD HEENT: NC/AT, EOMI, PERRLA. Neck: supple, no JVD. no LAD. CVS: S1, S2 normal, RRR, no m/g/r Extremities: no edema Neuro: aaox3

## 2024-08-06 NOTE — HEALTH RISK ASSESSMENT
[No] : In the past 12 months have you used drugs other than those required for medical reasons? No [0] : 2) Feeling down, depressed, or hopeless: Not at all (0) [Patient reported mammogram was normal] : Patient reported mammogram was normal [Patient reported PAP Smear was normal] : Patient reported PAP Smear was normal [Patient reported colonoscopy was abnormal] : Patient reported colonoscopy was abnormal [Audit-CScore] : 0 [RDH4Srtrd] : 0 [EyeExamDate] : 2022 [MammogramDate] : 2022 [MammogramComments] : GYN- [PapSmearDate] : 7/27/23 [PapSmearComments] : GYN- [ColonoscopyDate] : 2/18/22 [ColonoscopyComments] : diverticulosis, internal hemorrhoids, , repeat 2027

## 2024-08-06 NOTE — HISTORY OF PRESENT ILLNESS
[FreeTextEntry1] : Patient comes in to establish care. [de-identified] : TOMASA VALLEJO is a 54 year F who comes in to establish care. Pt with hx of MILKA (menstrual blood loss) on feraheme with hematologist, migraine headache. Pt following with hematology and had iron infusion recently with repeat labs stalbe last month. Pt notes increased constipation, having BM every 3-4 days and uses herbal tea to help, no black or bloody stool.  Patient denies any cp, sob, abdominal pain, nausea, vomiting, palpitations, fever, chills, diarrhea.

## 2024-08-06 NOTE — ASSESSMENT
[FreeTextEntry1] : 1.MILKA: follow up with hematologist, recent labs stable, advised f/u with GYN if heavy menses continue.   2.Migraine headaches: continue with Maxalt as needed, f/u with neurology.   3.HM: Patient counseled regarding recommendations for vaccines, diet and exercise and all preventative screening. f/u with GYN for mammogram, pap smear.   4.Constipation: start probiotic and metamucil, utd with colonoscopy, d/w her high fiber diet.

## 2024-08-14 ENCOUNTER — RX RENEWAL (OUTPATIENT)
Age: 54
End: 2024-08-14

## 2024-11-19 ENCOUNTER — OUTPATIENT (OUTPATIENT)
Dept: OUTPATIENT SERVICES | Facility: HOSPITAL | Age: 54
LOS: 1 days | Discharge: HOME | End: 2024-11-19

## 2024-11-19 DIAGNOSIS — D50.0 IRON DEFICIENCY ANEMIA SECONDARY TO BLOOD LOSS (CHRONIC): ICD-10-CM

## 2024-11-25 ENCOUNTER — RESULT REVIEW (OUTPATIENT)
Age: 54
End: 2024-11-25

## 2024-11-25 ENCOUNTER — APPOINTMENT (OUTPATIENT)
Dept: HEMATOLOGY ONCOLOGY | Facility: CLINIC | Age: 54
End: 2024-11-25

## 2024-11-25 LAB
BASOPHILS # BLD AUTO: 0.02 K/UL — SIGNIFICANT CHANGE UP (ref 0–0.2)
BASOPHILS NFR BLD AUTO: 0.4 % — SIGNIFICANT CHANGE UP (ref 0–2)
EOSINOPHIL # BLD AUTO: 0.08 K/UL — SIGNIFICANT CHANGE UP (ref 0–0.5)
EOSINOPHIL NFR BLD AUTO: 1.7 % — SIGNIFICANT CHANGE UP (ref 0–6)
HCT VFR BLD CALC: 36.4 % — SIGNIFICANT CHANGE UP (ref 34.5–45)
HGB BLD-MCNC: 12.2 G/DL — SIGNIFICANT CHANGE UP (ref 11.5–15.5)
IMM GRANULOCYTES NFR BLD AUTO: 0.2 % — SIGNIFICANT CHANGE UP (ref 0–0.9)
LYMPHOCYTES # BLD AUTO: 1.2 K/UL — SIGNIFICANT CHANGE UP (ref 1–3.3)
LYMPHOCYTES # BLD AUTO: 24.9 % — SIGNIFICANT CHANGE UP (ref 13–44)
MCHC RBC-ENTMCNC: 28.8 PG — SIGNIFICANT CHANGE UP (ref 27–34)
MCHC RBC-ENTMCNC: 33.5 G/DL — SIGNIFICANT CHANGE UP (ref 32–36)
MCV RBC AUTO: 86.1 FL — SIGNIFICANT CHANGE UP (ref 80–100)
MONOCYTES # BLD AUTO: 0.32 K/UL — SIGNIFICANT CHANGE UP (ref 0–0.9)
MONOCYTES NFR BLD AUTO: 6.6 % — SIGNIFICANT CHANGE UP (ref 2–14)
NEUTROPHILS # BLD AUTO: 3.19 K/UL — SIGNIFICANT CHANGE UP (ref 1.8–7.4)
NEUTROPHILS NFR BLD AUTO: 66.2 % — SIGNIFICANT CHANGE UP (ref 43–77)
NRBC # BLD: 0 /100 WBCS — SIGNIFICANT CHANGE UP (ref 0–0)
NRBC BLD-RTO: 0 /100 WBCS — SIGNIFICANT CHANGE UP (ref 0–0)
PLATELET # BLD AUTO: 197 K/UL — SIGNIFICANT CHANGE UP (ref 150–400)
RBC # BLD: 4.23 M/UL — SIGNIFICANT CHANGE UP (ref 3.8–5.2)
RBC # FLD: 12.3 % — SIGNIFICANT CHANGE UP (ref 10.3–14.5)
WBC # BLD: 4.82 K/UL — SIGNIFICANT CHANGE UP (ref 3.8–10.5)
WBC # FLD AUTO: 4.82 K/UL — SIGNIFICANT CHANGE UP (ref 3.8–10.5)

## 2024-11-26 ENCOUNTER — NON-APPOINTMENT (OUTPATIENT)
Age: 54
End: 2024-11-26

## 2024-11-26 LAB
ERYTHROCYTE [SEDIMENTATION RATE] IN BLOOD BY WESTERGREN METHOD: 4 MM/HR
FERRITIN SERPL-MCNC: 173 NG/ML
IRON SATN MFR SERPL: 26 %
IRON SERPL-MCNC: 72 UG/DL
TIBC SERPL-MCNC: 273 UG/DL
UIBC SERPL-MCNC: 202 UG/DL

## 2024-12-06 ENCOUNTER — EMERGENCY (EMERGENCY)
Facility: HOSPITAL | Age: 54
LOS: 0 days | Discharge: HOME | End: 2024-12-06
Attending: EMERGENCY MEDICINE
Payer: MEDICAID

## 2024-12-06 ENCOUNTER — NON-APPOINTMENT (OUTPATIENT)
Age: 54
End: 2024-12-06

## 2024-12-06 ENCOUNTER — APPOINTMENT (OUTPATIENT)
Dept: FAMILY MEDICINE | Facility: CLINIC | Age: 54
End: 2024-12-06

## 2024-12-06 VITALS
TEMPERATURE: 98 F | SYSTOLIC BLOOD PRESSURE: 134 MMHG | OXYGEN SATURATION: 99 % | RESPIRATION RATE: 17 BRPM | HEART RATE: 82 BPM | DIASTOLIC BLOOD PRESSURE: 93 MMHG

## 2024-12-06 VITALS — HEIGHT: 66 IN | WEIGHT: 128.09 LBS

## 2024-12-06 DIAGNOSIS — I10 ESSENTIAL (PRIMARY) HYPERTENSION: ICD-10-CM

## 2024-12-06 LAB
ANION GAP SERPL CALC-SCNC: 3 MMOL/L — LOW (ref 5–17)
BASOPHILS # BLD AUTO: 0.02 K/UL — SIGNIFICANT CHANGE UP (ref 0–0.2)
BASOPHILS NFR BLD AUTO: 0.4 % — SIGNIFICANT CHANGE UP (ref 0–2)
BUN SERPL-MCNC: 13 MG/DL — SIGNIFICANT CHANGE UP (ref 7–23)
CALCIUM SERPL-MCNC: 9.1 MG/DL — SIGNIFICANT CHANGE UP (ref 8.5–10.1)
CHLORIDE SERPL-SCNC: 106 MMOL/L — SIGNIFICANT CHANGE UP (ref 96–108)
CO2 SERPL-SCNC: 30 MMOL/L — SIGNIFICANT CHANGE UP (ref 22–31)
CREAT SERPL-MCNC: 0.87 MG/DL — SIGNIFICANT CHANGE UP (ref 0.5–1.3)
EGFR: 79 ML/MIN/1.73M2 — SIGNIFICANT CHANGE UP
EOSINOPHIL # BLD AUTO: 0.05 K/UL — SIGNIFICANT CHANGE UP (ref 0–0.5)
EOSINOPHIL NFR BLD AUTO: 1 % — SIGNIFICANT CHANGE UP (ref 0–6)
GLUCOSE SERPL-MCNC: 93 MG/DL — SIGNIFICANT CHANGE UP (ref 70–99)
HCT VFR BLD CALC: 39.3 % — SIGNIFICANT CHANGE UP (ref 34.5–45)
HGB BLD-MCNC: 12.6 G/DL — SIGNIFICANT CHANGE UP (ref 11.5–15.5)
IMM GRANULOCYTES NFR BLD AUTO: 0.6 % — SIGNIFICANT CHANGE UP (ref 0–0.9)
LYMPHOCYTES # BLD AUTO: 0.87 K/UL — LOW (ref 1–3.3)
LYMPHOCYTES # BLD AUTO: 16.8 % — SIGNIFICANT CHANGE UP (ref 13–44)
MCHC RBC-ENTMCNC: 28.2 PG — SIGNIFICANT CHANGE UP (ref 27–34)
MCHC RBC-ENTMCNC: 32.1 G/DL — SIGNIFICANT CHANGE UP (ref 32–36)
MCV RBC AUTO: 87.9 FL — SIGNIFICANT CHANGE UP (ref 80–100)
MONOCYTES # BLD AUTO: 0.55 K/UL — SIGNIFICANT CHANGE UP (ref 0–0.9)
MONOCYTES NFR BLD AUTO: 10.6 % — SIGNIFICANT CHANGE UP (ref 2–14)
NEUTROPHILS # BLD AUTO: 3.67 K/UL — SIGNIFICANT CHANGE UP (ref 1.8–7.4)
NEUTROPHILS NFR BLD AUTO: 70.6 % — SIGNIFICANT CHANGE UP (ref 43–77)
PLATELET # BLD AUTO: 207 K/UL — SIGNIFICANT CHANGE UP (ref 150–400)
POTASSIUM SERPL-MCNC: 4 MMOL/L — SIGNIFICANT CHANGE UP (ref 3.5–5.3)
POTASSIUM SERPL-SCNC: 4 MMOL/L — SIGNIFICANT CHANGE UP (ref 3.5–5.3)
RBC # BLD: 4.47 M/UL — SIGNIFICANT CHANGE UP (ref 3.8–5.2)
RBC # FLD: 12.5 % — SIGNIFICANT CHANGE UP (ref 10.3–14.5)
SODIUM SERPL-SCNC: 139 MMOL/L — SIGNIFICANT CHANGE UP (ref 135–145)
WBC # BLD: 5.19 K/UL — SIGNIFICANT CHANGE UP (ref 3.8–10.5)
WBC # FLD AUTO: 5.19 K/UL — SIGNIFICANT CHANGE UP (ref 3.8–10.5)

## 2024-12-06 PROCEDURE — 93005 ELECTROCARDIOGRAM TRACING: CPT

## 2024-12-06 PROCEDURE — 99284 EMERGENCY DEPT VISIT MOD MDM: CPT

## 2024-12-06 PROCEDURE — 36415 COLL VENOUS BLD VENIPUNCTURE: CPT

## 2024-12-06 PROCEDURE — 99283 EMERGENCY DEPT VISIT LOW MDM: CPT | Mod: 25

## 2024-12-06 PROCEDURE — 80048 BASIC METABOLIC PNL TOTAL CA: CPT

## 2024-12-06 PROCEDURE — 93010 ELECTROCARDIOGRAM REPORT: CPT

## 2024-12-06 PROCEDURE — 85025 COMPLETE CBC W/AUTO DIFF WBC: CPT

## 2024-12-06 NOTE — ED ADULT NURSE NOTE - OBJECTIVE STATEMENT
pt presenting w/report of HTN at home. States she was working on her computer and suddenly felt dizzy, she laid down on the cough and felt her fast, used a home pulse ox and it showed 222. Pt has no medical HX, very well appearing, reading a book. States her sister told her to check her BP bc her sister had a headache and a blood vessel popped in her eye and when they took her BP it was high.

## 2024-12-06 NOTE — ED ADULT TRIAGE NOTE - CHIEF COMPLAINT QUOTE
Pt ambulatory to the ED with c/o HTN and palpitations. Pt endorses for the last week she's been experiencing HTN, highest BP was 187/125. Pt works from home and yesterday she felt lightheaded and took her HR which read 222. Pt denies any CARDIAC hx. Pt endorses some fatigue in triage but no other symptoms. HR is 93 and 02 is 98% on RA. NKDA.

## 2024-12-06 NOTE — ED STATDOCS - PROGRESS NOTE DETAILS
54-year-old female with no significant past medical history presents with high blood pressure.  Patient states that she checked her blood pressure yesterday because she noted that she was having a headache and noticed that her blood pressure was in the 180s.  Shortly after she developed palpitations and noticed that her heart rate was in the 200s after a few minutes dropped down to the 60s.  Patient spoke with the nurse from her primary care doctor's office who instructed her to go to the emergency room for evaluation.  Patient did admit to using 2 packets of liquid IV twice a day and was unsure if that may be causing her high blood pressure.  Blood pressure from the emergency room is 134/93 with a heart rate of 82.  Will check basic labs, EKG and reevaluate.  Likely discharge home and have patient follow-up with primary care doctor for evaluation of her blood pressure. -Gerardo Shipman PA-C Labs unremarkable.  Informed patient of the results.  Also recommended to alter salt intake in her diet, avoid caffeinated beverages, and follow-up with her primary care doctor.  Patient aware and agrees with plan. -Gerardo Shipman PA-C

## 2024-12-06 NOTE — ED ADULT NURSE NOTE - NSFALLUNIVINTERV_ED_ALL_ED
Bed/Stretcher in lowest position, wheels locked, appropriate side rails in place/Call bell, personal items and telephone in reach/Instruct patient to call for assistance before getting out of bed/chair/stretcher/Non-slip footwear applied when patient is off stretcher/[place] to call system/Physically safe environment - no spills, clutter or unnecessary equipment/Purposeful proactive rounding/Room/bathroom lighting operational, light cord in reach

## 2024-12-06 NOTE — ED STATDOCS - CLINICAL SUMMARY MEDICAL DECISION MAKING FREE TEXT BOX
Pt here because she's had elevated blood pressure at home, no chest pain, no shortness of breath. outpatient doctor said need to have electrolytes hcecked, will check and recommend followup with PMD.

## 2024-12-06 NOTE — ED STATDOCS - OBJECTIVE STATEMENT
55 y/o female w/ PMHx vertigo presents c/o HTN x1w. Pt reports yesterday she felt faint, and "my heart felt like it was coming out of my chest." Reports highest blood pressure of 187/125, took heart rate yesterday and it was 222. Denies cardiac hx. Endorses fatigue and headache. Denies chest pain or difficulty breathing. No other complaints at this time.

## 2024-12-06 NOTE — ED STATDOCS - PATIENT PORTAL LINK FT
You can access the FollowMyHealth Patient Portal offered by [facility] by registering at the following website: [email or web address]. By joining [facility]’s FollowMyHealth portal, you will also be able to view your health information using other applications (apps) compatible with our system.

## 2024-12-06 NOTE — ED STATDOCS - ATTENDING APP SHARED VISIT CONTRIBUTION OF CARE
I,Owen Zuñiga MD,  performed the initial face to face bedside interview with this patient regarding history of present illness, review of symptoms and relevant past medical, social and family history.  I completed an independent physical examination.  I was the initial provider who evaluated this patient. I have signed out the follow up of any pending tests (i.e. labs, radiological studies) to the ACP.  I have communicated the patient’s plan of care and disposition with the ACP.  The history, relevant review of systems, past medical and surgical history, medical decision making, and physical examination was documented by the scribe in my presence and I attest to the accuracy of the documentation.

## 2024-12-09 ENCOUNTER — APPOINTMENT (OUTPATIENT)
Dept: CARDIOLOGY | Facility: CLINIC | Age: 54
End: 2024-12-09

## 2024-12-10 ENCOUNTER — APPOINTMENT (OUTPATIENT)
Dept: INTERNAL MEDICINE | Facility: CLINIC | Age: 54
End: 2024-12-10
Payer: MEDICAID

## 2024-12-10 VITALS — DIASTOLIC BLOOD PRESSURE: 80 MMHG | SYSTOLIC BLOOD PRESSURE: 136 MMHG

## 2024-12-10 VITALS — DIASTOLIC BLOOD PRESSURE: 84 MMHG | SYSTOLIC BLOOD PRESSURE: 142 MMHG

## 2024-12-10 VITALS
BODY MASS INDEX: 21.23 KG/M2 | HEIGHT: 65.5 IN | SYSTOLIC BLOOD PRESSURE: 142 MMHG | WEIGHT: 129 LBS | TEMPERATURE: 98.3 F | OXYGEN SATURATION: 98 % | HEART RATE: 84 BPM | DIASTOLIC BLOOD PRESSURE: 82 MMHG

## 2024-12-10 DIAGNOSIS — I10 ESSENTIAL (PRIMARY) HYPERTENSION: ICD-10-CM

## 2024-12-10 PROCEDURE — G2211 COMPLEX E/M VISIT ADD ON: CPT | Mod: NC

## 2024-12-10 PROCEDURE — 99214 OFFICE O/P EST MOD 30 MIN: CPT

## 2024-12-19 ENCOUNTER — NON-APPOINTMENT (OUTPATIENT)
Age: 54
End: 2024-12-19

## 2024-12-19 ENCOUNTER — APPOINTMENT (OUTPATIENT)
Dept: CARDIOLOGY | Facility: CLINIC | Age: 54
End: 2024-12-19
Payer: MEDICAID

## 2024-12-19 VITALS
SYSTOLIC BLOOD PRESSURE: 113 MMHG | WEIGHT: 128 LBS | OXYGEN SATURATION: 98 % | BODY MASS INDEX: 21.07 KG/M2 | DIASTOLIC BLOOD PRESSURE: 74 MMHG | HEIGHT: 65.5 IN | HEART RATE: 80 BPM

## 2024-12-19 DIAGNOSIS — R00.2 PALPITATIONS: ICD-10-CM

## 2024-12-19 PROCEDURE — 93000 ELECTROCARDIOGRAM COMPLETE: CPT

## 2024-12-19 PROCEDURE — 99215 OFFICE O/P EST HI 40 MIN: CPT | Mod: 25

## 2024-12-19 RX ORDER — TRANEXAMIC ACID 650 MG/1
TABLET ORAL
Refills: 0 | Status: ACTIVE | COMMUNITY

## 2024-12-25 PROBLEM — F10.90 ALCOHOL USE: Status: INACTIVE | Noted: 2021-10-22

## 2025-01-09 ENCOUNTER — APPOINTMENT (OUTPATIENT)
Dept: OBGYN | Facility: CLINIC | Age: 55
End: 2025-01-09
Payer: MEDICAID

## 2025-01-09 VITALS
SYSTOLIC BLOOD PRESSURE: 122 MMHG | DIASTOLIC BLOOD PRESSURE: 72 MMHG | HEIGHT: 65.5 IN | WEIGHT: 128 LBS | BODY MASS INDEX: 21.07 KG/M2

## 2025-01-09 DIAGNOSIS — Z01.419 ENCOUNTER FOR GYNECOLOGICAL EXAMINATION (GENERAL) (ROUTINE) W/OUT ABNORMAL FINDINGS: ICD-10-CM

## 2025-01-09 PROCEDURE — 99396 PREV VISIT EST AGE 40-64: CPT

## 2025-01-12 LAB — HPV HIGH+LOW RISK DNA PNL CVX: NOT DETECTED

## 2025-01-14 ENCOUNTER — APPOINTMENT (OUTPATIENT)
Dept: CARDIOLOGY | Facility: CLINIC | Age: 55
End: 2025-01-14
Payer: MEDICAID

## 2025-01-14 VITALS
BODY MASS INDEX: 21.23 KG/M2 | WEIGHT: 129 LBS | HEIGHT: 65.5 IN | OXYGEN SATURATION: 100 % | SYSTOLIC BLOOD PRESSURE: 124 MMHG | DIASTOLIC BLOOD PRESSURE: 70 MMHG | HEART RATE: 74 BPM

## 2025-01-14 LAB — CYTOLOGY CVX/VAG DOC THIN PREP: NORMAL

## 2025-01-14 PROCEDURE — G2211 COMPLEX E/M VISIT ADD ON: CPT | Mod: NC

## 2025-01-14 PROCEDURE — 93306 TTE W/DOPPLER COMPLETE: CPT

## 2025-01-14 PROCEDURE — 99215 OFFICE O/P EST HI 40 MIN: CPT | Mod: 25

## 2025-01-14 RX ORDER — METOPROLOL TARTRATE 25 MG/1
25 TABLET ORAL TWICE DAILY
Qty: 30 | Refills: 3 | Status: ACTIVE | COMMUNITY
Start: 2025-01-14 | End: 1900-01-01

## 2025-01-15 ENCOUNTER — OFFICE (OUTPATIENT)
Dept: URBAN - METROPOLITAN AREA CLINIC 70 | Facility: CLINIC | Age: 55
Setting detail: OPHTHALMOLOGY
End: 2025-01-15
Payer: COMMERCIAL

## 2025-01-15 DIAGNOSIS — H16.223: ICD-10-CM

## 2025-01-15 PROCEDURE — 92002 INTRM OPH EXAM NEW PATIENT: CPT | Performed by: STUDENT IN AN ORGANIZED HEALTH CARE EDUCATION/TRAINING PROGRAM

## 2025-01-15 ASSESSMENT — KERATOMETRY
OS_AXISANGLE_DEGREES: 093
OD_K1POWER_DIOPTERS: 46.50
OS_K2POWER_DIOPTERS: 47.00
OD_K2POWER_DIOPTERS: 47.50
OS_K1POWER_DIOPTERS: 46.25
OD_AXISANGLE_DEGREES: 087

## 2025-01-15 ASSESSMENT — REFRACTION_AUTOREFRACTION
OD_CYLINDER: -0.50
OS_SPHERE: PLANO
OS_CYLINDER: -0.75
OD_SPHERE: -0.50
OS_AXIS: 099
OD_AXIS: 098

## 2025-01-15 ASSESSMENT — REFRACTION_MANIFEST
OS_ADD: +2.00
OD_AXIS: 090
OS_AXIS: 100
OS_SPHERE: PLANO
OD_SPHERE: -0.50
OD_CYLINDER: -0.50
OD_SPHERE: +1.00
OS_SPHERE: +1.00
OD_VA1: 20/20
OD_VA2: 20/20
OS_CYLINDER: -0.75
OD_ADD: +2.00
OS_VA1: 20/20
OS_VA2: 20/20
OU_VA: 20/20

## 2025-01-15 ASSESSMENT — VISUAL ACUITY
OS_BCVA: 20/20-
OD_BCVA: 20/20-2

## 2025-01-15 ASSESSMENT — CONFRONTATIONAL VISUAL FIELD TEST (CVF)
OS_FINDINGS: FULL
OD_FINDINGS: FULL

## 2025-01-15 ASSESSMENT — SUPERFICIAL PUNCTATE KERATITIS (SPK)
OS_SPK: 1+
OD_SPK: 1+

## 2025-01-31 ENCOUNTER — APPOINTMENT (OUTPATIENT)
Dept: INTERNAL MEDICINE | Facility: CLINIC | Age: 55
End: 2025-01-31
Payer: MEDICAID

## 2025-01-31 VITALS
BODY MASS INDEX: 21.07 KG/M2 | SYSTOLIC BLOOD PRESSURE: 124 MMHG | TEMPERATURE: 98.3 F | OXYGEN SATURATION: 97 % | WEIGHT: 128 LBS | HEART RATE: 80 BPM | HEIGHT: 65.5 IN | DIASTOLIC BLOOD PRESSURE: 82 MMHG

## 2025-01-31 DIAGNOSIS — D50.9 IRON DEFICIENCY ANEMIA, UNSPECIFIED: ICD-10-CM

## 2025-01-31 DIAGNOSIS — M19.041 PRIMARY OSTEOARTHRITIS, RIGHT HAND: ICD-10-CM

## 2025-01-31 DIAGNOSIS — D50.0 IRON DEFICIENCY ANEMIA SECONDARY TO BLOOD LOSS (CHRONIC): ICD-10-CM

## 2025-01-31 DIAGNOSIS — Z00.00 ENCOUNTER FOR GENERAL ADULT MEDICAL EXAMINATION W/OUT ABNORMAL FINDINGS: ICD-10-CM

## 2025-01-31 DIAGNOSIS — M19.042 PRIMARY OSTEOARTHRITIS, RIGHT HAND: ICD-10-CM

## 2025-01-31 DIAGNOSIS — M54.50 LOW BACK PAIN, UNSPECIFIED: ICD-10-CM

## 2025-01-31 DIAGNOSIS — T45.2X1A POISONING BY VITAMINS, ACCIDENTAL (UNINTENTIONAL), INITIAL ENCOUNTER: ICD-10-CM

## 2025-01-31 DIAGNOSIS — R19.7 DIARRHEA, UNSPECIFIED: ICD-10-CM

## 2025-01-31 DIAGNOSIS — I10 ESSENTIAL (PRIMARY) HYPERTENSION: ICD-10-CM

## 2025-01-31 PROCEDURE — 99213 OFFICE O/P EST LOW 20 MIN: CPT | Mod: 25

## 2025-01-31 PROCEDURE — 99396 PREV VISIT EST AGE 40-64: CPT

## 2025-02-04 LAB
25(OH)D3 SERPL-MCNC: 50.5 NG/ML
ALBUMIN SERPL ELPH-MCNC: 4.3 G/DL
ALP BLD-CCNC: 87 U/L
ALT SERPL-CCNC: 27 U/L
AST SERPL-CCNC: 20 U/L
BILIRUB DIRECT SERPL-MCNC: 0.1 MG/DL
BILIRUB INDIRECT SERPL-MCNC: 0.3 MG/DL
BILIRUB SERPL-MCNC: 0.5 MG/DL
CHOLEST SERPL-MCNC: 191 MG/DL
DEPRECATED O AND P PREP STL: NORMAL
HDLC SERPL-MCNC: 81 MG/DL
LDLC SERPL CALC-MCNC: 99 MG/DL
NONHDLC SERPL-MCNC: 110 MG/DL
PROT SERPL-MCNC: 7 G/DL
TRIGL SERPL-MCNC: 56 MG/DL
TSH SERPL-ACNC: 3.52 UIU/ML
VIT B12 SERPL-MCNC: 621 PG/ML

## 2025-02-07 ENCOUNTER — APPOINTMENT (OUTPATIENT)
Dept: INTERNAL MEDICINE | Facility: CLINIC | Age: 55
End: 2025-02-07

## 2025-03-14 ENCOUNTER — OUTPATIENT (OUTPATIENT)
Dept: OUTPATIENT SERVICES | Facility: HOSPITAL | Age: 55
LOS: 1 days | Discharge: HOME | End: 2025-03-14

## 2025-03-14 DIAGNOSIS — D50.0 IRON DEFICIENCY ANEMIA SECONDARY TO BLOOD LOSS (CHRONIC): ICD-10-CM

## 2025-03-17 ENCOUNTER — RESULT REVIEW (OUTPATIENT)
Age: 55
End: 2025-03-17

## 2025-03-17 ENCOUNTER — APPOINTMENT (OUTPATIENT)
Dept: HEMATOLOGY ONCOLOGY | Facility: CLINIC | Age: 55
End: 2025-03-17

## 2025-03-17 LAB
BASOPHILS # BLD AUTO: 0.02 K/UL — SIGNIFICANT CHANGE UP (ref 0–0.2)
BASOPHILS NFR BLD AUTO: 0.3 % — SIGNIFICANT CHANGE UP (ref 0–2)
EOSINOPHIL # BLD AUTO: 0.06 K/UL — SIGNIFICANT CHANGE UP (ref 0–0.5)
EOSINOPHIL NFR BLD AUTO: 1 % — SIGNIFICANT CHANGE UP (ref 0–6)
HCT VFR BLD CALC: 41.7 % — SIGNIFICANT CHANGE UP (ref 34.5–45)
HGB BLD-MCNC: 14.1 G/DL — SIGNIFICANT CHANGE UP (ref 11.5–15.5)
IMM GRANULOCYTES NFR BLD AUTO: 0.2 % — SIGNIFICANT CHANGE UP (ref 0–0.9)
LYMPHOCYTES # BLD AUTO: 1.35 K/UL — SIGNIFICANT CHANGE UP (ref 1–3.3)
LYMPHOCYTES # BLD AUTO: 23 % — SIGNIFICANT CHANGE UP (ref 13–44)
MCHC RBC-ENTMCNC: 29 PG — SIGNIFICANT CHANGE UP (ref 27–34)
MCHC RBC-ENTMCNC: 33.8 G/DL — SIGNIFICANT CHANGE UP (ref 32–36)
MCV RBC AUTO: 85.6 FL — SIGNIFICANT CHANGE UP (ref 80–100)
MONOCYTES # BLD AUTO: 0.34 K/UL — SIGNIFICANT CHANGE UP (ref 0–0.9)
MONOCYTES NFR BLD AUTO: 5.8 % — SIGNIFICANT CHANGE UP (ref 2–14)
NEUTROPHILS # BLD AUTO: 4.08 K/UL — SIGNIFICANT CHANGE UP (ref 1.8–7.4)
NEUTROPHILS NFR BLD AUTO: 69.7 % — SIGNIFICANT CHANGE UP (ref 43–77)
NRBC BLD AUTO-RTO: 0 /100 WBCS — SIGNIFICANT CHANGE UP (ref 0–0)
PLATELET # BLD AUTO: 266 K/UL — SIGNIFICANT CHANGE UP (ref 150–400)
RBC # BLD: 4.87 M/UL — SIGNIFICANT CHANGE UP (ref 3.8–5.2)
RBC # FLD: 12 % — SIGNIFICANT CHANGE UP (ref 10.3–14.5)
WBC # BLD: 5.86 K/UL — SIGNIFICANT CHANGE UP (ref 3.8–10.5)
WBC # FLD AUTO: 5.86 K/UL — SIGNIFICANT CHANGE UP (ref 3.8–10.5)

## 2025-03-18 ENCOUNTER — NON-APPOINTMENT (OUTPATIENT)
Age: 55
End: 2025-03-18

## 2025-06-02 ENCOUNTER — APPOINTMENT (OUTPATIENT)
Dept: NEUROLOGY | Facility: CLINIC | Age: 55
End: 2025-06-02

## 2025-06-02 ENCOUNTER — APPOINTMENT (OUTPATIENT)
Dept: NEUROLOGY | Facility: CLINIC | Age: 55
End: 2025-06-02
Payer: MEDICAID

## 2025-06-02 PROCEDURE — 99213 OFFICE O/P EST LOW 20 MIN: CPT | Mod: 95

## 2025-06-19 NOTE — HISTORY OF PRESENT ILLNESS
Airway    Performed by: Aj Barber MD  Authorized by: Aj Barber MD    Final Airway Type:  Endotracheal airway  Final Endotracheal Airway*:  ETT  ETT Size (mm)*:  7.5  Cuff*:  Regular  Technique Used for Successful ETT Placement:  Video laryngoscopy  Devices/Methods Used in Placement*:  Mask  Intubation Procedure*:  ETCO2, Preoxygenation, Atraumatic, Dentition Unchanged and Pharynx Clear  Insertion Site:  Oral  Blade Type*:  Video Laryngoscope  Blade Size*:  4  Cuff Volume (mL):  8  Measured from*:  Teeth  Secured at (cm)*:  23  Placement Verified by: auscultation, capnometry and palpation of cuff    Glottic View*:  1 - full view of glottis  Attempts*:  1   Patient Identified, Procedure confirmed, Emergency equipment available and Safety protocols followed  Location:  OR  Urgency:  Elective  Difficult Airway: No    Indications for Airway Management:  Anesthesia  Sedation Level:  Deep  Mask Difficulty Assessment:  1 - vent by mask  Start Time: 6/19/2025 12:46 PM      
Arterial Line    Performed by: Aj Barber MD  Authorized by: Aj Barber MD    Patient Location:  OR  Indication*:  Continuous blood pressure monitoring  Laterality*:  Left  Site*:  Radial  Max Sterile Barrier Technique*:  Sterile gloves, Sterile dressing applied and Cap/Mask  Local Anesthetic:  None  Prep*:  Chlorhexidine gluconate (CHG)  Catheter Size*:  20 G  Catheter Type:  Arrow  Ultrasound-Guided*: No    Seldinger Technique: Yes    Line Secured*:  Tape and Transparent dressing  Events: patient tolerated procedure well with no complications    Start Time: 6/19/2025 12:50 PM      
[FreeTextEntry8] : \par 53 yo F pmhx premenstrual syndrome, dysmenorrhea, anemia, chronic headaches, low vit d, s/p ER  with vertigo here for acute visit\par \par c/o not feeling well, having sob and dizziness on/off\par \par States started feeling unwell - onset of fatigue, gen aching, fever to 102, sore throat and productive cough.  \par Did home covid test  and it was positive.\par States did home remedies and sx's since resolved except fatigue and now with min dry cough.\par \par Having dizziness (mainly lightheaded feeling) on/off x 2 weeks, onset with any movement and sometimes feels will pass out.  \par Notes has been mostly in bed since covid infection due to fatigue.\par \par +sob on/off x 2 weeks, onset can at rest.  Gets better with taking deep breaths.  Does not feel is anxious at onset of sob.\par \par States had left chest pain 2d ago noted on awakening- mainly "clenching pain" on/off, lasted 30 seconds, no clear relation to movement\par +mild b/l feet swelling noted at night.  Denies calf pain.\par \par +low appetite, notes trying to stay hydrated\par \par +low back pain- at left lower area x4d\par different for hx prior, no meds taken\par \par Denies LOC, HA, vision trouble, dysarthria or focal weakness.\par Denies n/v, abd pain.  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 with there one reported "was very sick"\par \par Reports is socially distancing and using precautions for covid prevention.\par -no hx vaccine, declines\par \par hx chronic headaches- notes overall less frequent and less intense\par -followed by neurology and on Maxalt prn- taking 4x/wk, plans to f/u soon\par -HA: throbbing, frontal, +light/noise sensitivity; no vision loss or dizziness, dysuria or focal weakness\par -resolves with sleep or Excedrin use\par -triggers: dehydration\par -hx negative CTs scans- last  in ER for vertigo (since resolved)\par -reports nl dilated exxam by optho 10/21, planned to f/u yearly \par \par hx anemia, heavy menses- \par -followed by GYN, seen  with negative PAP, states told then no medication indicated\par -followed by hematology, hx IV iron- last 3 weeks ago, off oral iron since per heme. \par -hx screening colonoscopy : hemorrhoids, diverticulosis, rec: repeat in 5 yrs (Dr. De Leon)\par -denies palpitations, CP or sob\par \par hx PMS- reports controlled\par -on fluoxetine by GYN\par

## 2025-07-28 ENCOUNTER — RX RENEWAL (OUTPATIENT)
Age: 55
End: 2025-07-28

## 2025-08-01 ENCOUNTER — APPOINTMENT (OUTPATIENT)
Dept: INTERNAL MEDICINE | Facility: CLINIC | Age: 55
End: 2025-08-01
Payer: MEDICAID

## 2025-08-01 VITALS
DIASTOLIC BLOOD PRESSURE: 84 MMHG | WEIGHT: 133 LBS | HEIGHT: 65.6 IN | TEMPERATURE: 98.5 F | SYSTOLIC BLOOD PRESSURE: 128 MMHG | OXYGEN SATURATION: 99 % | HEART RATE: 64 BPM | BODY MASS INDEX: 21.63 KG/M2

## 2025-08-01 DIAGNOSIS — T45.2X1A POISONING BY VITAMINS, ACCIDENTAL (UNINTENTIONAL), INITIAL ENCOUNTER: ICD-10-CM

## 2025-08-01 DIAGNOSIS — G47.10 HYPERSOMNIA, UNSPECIFIED: ICD-10-CM

## 2025-08-01 DIAGNOSIS — R04.0 EPISTAXIS: ICD-10-CM

## 2025-08-01 DIAGNOSIS — I10 ESSENTIAL (PRIMARY) HYPERTENSION: ICD-10-CM

## 2025-08-01 DIAGNOSIS — R53.83 OTHER FATIGUE: ICD-10-CM

## 2025-08-01 DIAGNOSIS — D50.9 IRON DEFICIENCY ANEMIA, UNSPECIFIED: ICD-10-CM

## 2025-08-01 PROCEDURE — 99214 OFFICE O/P EST MOD 30 MIN: CPT

## 2025-08-01 RX ORDER — GLUC/MSM/COLGN2/HYAL/ANTIARTH3 375-375-20
TABLET ORAL
Refills: 0 | Status: ACTIVE | COMMUNITY

## 2025-08-12 ENCOUNTER — TRANSCRIPTION ENCOUNTER (OUTPATIENT)
Age: 55
End: 2025-08-12

## 2025-08-12 LAB
25(OH)D3 SERPL-MCNC: 45.8 NG/ML
ALBUMIN SERPL ELPH-MCNC: 4.1 G/DL
ALP BLD-CCNC: 100 U/L
ALT SERPL-CCNC: 37 U/L
ANION GAP SERPL CALC-SCNC: 11 MMOL/L
APPEARANCE: CLEAR
APTT BLD: 33.9 SEC
AST SERPL-CCNC: 32 U/L
BILIRUB SERPL-MCNC: 0.5 MG/DL
BILIRUBIN URINE: NEGATIVE
BLOOD URINE: NEGATIVE
BUN SERPL-MCNC: 12 MG/DL
CALCIUM SERPL-MCNC: 9.4 MG/DL
CHLORIDE SERPL-SCNC: 106 MMOL/L
CHOLEST SERPL-MCNC: 206 MG/DL
CO2 SERPL-SCNC: 24 MMOL/L
COLOR: YELLOW
CREAT SERPL-MCNC: 0.77 MG/DL
EGFRCR SERPLBLD CKD-EPI 2021: 91 ML/MIN/1.73M2
ESTIMATED AVERAGE GLUCOSE: 108 MG/DL
GLUCOSE QUALITATIVE U: NEGATIVE MG/DL
GLUCOSE SERPL-MCNC: 86 MG/DL
HBA1C MFR BLD HPLC: 5.4 %
HDLC SERPL-MCNC: 76 MG/DL
INR PPP: 0.91 RATIO
KETONES URINE: NEGATIVE MG/DL
LDLC SERPL-MCNC: 120 MG/DL
LEUKOCYTE ESTERASE URINE: NEGATIVE
NITRITE URINE: NEGATIVE
NONHDLC SERPL-MCNC: 130 MG/DL
PH URINE: 6.5
POTASSIUM SERPL-SCNC: 4.5 MMOL/L
PROT SERPL-MCNC: 6.3 G/DL
PROTEIN URINE: NEGATIVE MG/DL
PT BLD: 10.7 SEC
SODIUM SERPL-SCNC: 140 MMOL/L
SPECIFIC GRAVITY URINE: 1.01
T4 FREE SERPL-MCNC: 1.1 NG/DL
TRIGL SERPL-MCNC: 56 MG/DL
TSH SERPL-ACNC: 4.15 UIU/ML
UROBILINOGEN URINE: 0.2 MG/DL
VIT B12 SERPL-MCNC: 597 PG/ML

## 2025-09-12 ENCOUNTER — APPOINTMENT (OUTPATIENT)
Dept: HEMATOLOGY ONCOLOGY | Facility: CLINIC | Age: 55
End: 2025-09-12

## 2025-09-12 ENCOUNTER — RESULT REVIEW (OUTPATIENT)
Age: 55
End: 2025-09-12

## 2025-09-14 LAB
ERYTHROCYTE [SEDIMENTATION RATE] IN BLOOD BY WESTERGREN METHOD: 15 MM/HR
FERRITIN SERPL-MCNC: 234 NG/ML
IRON SATN MFR SERPL: 37 %
IRON SERPL-MCNC: 101 UG/DL
TIBC SERPL-MCNC: 274 UG/DL
UIBC SERPL-MCNC: 173 UG/DL